# Patient Record
Sex: FEMALE | Race: WHITE | Employment: PART TIME | URBAN - METROPOLITAN AREA
[De-identification: names, ages, dates, MRNs, and addresses within clinical notes are randomized per-mention and may not be internally consistent; named-entity substitution may affect disease eponyms.]

---

## 2018-12-20 ENCOUNTER — OFFICE VISIT (OUTPATIENT)
Dept: INTERNAL MEDICINE CLINIC | Facility: CLINIC | Age: 58
End: 2018-12-20
Payer: COMMERCIAL

## 2018-12-20 VITALS
RESPIRATION RATE: 16 BRPM | SYSTOLIC BLOOD PRESSURE: 152 MMHG | DIASTOLIC BLOOD PRESSURE: 76 MMHG | HEART RATE: 72 BPM | TEMPERATURE: 99 F | WEIGHT: 186 LBS | BODY MASS INDEX: 32.96 KG/M2 | HEIGHT: 63 IN

## 2018-12-20 DIAGNOSIS — Z13.220 SCREENING FOR LIPID DISORDERS: Primary | ICD-10-CM

## 2018-12-20 DIAGNOSIS — E78.89 LIPIDS ABNORMAL: ICD-10-CM

## 2018-12-20 PROCEDURE — 99203 OFFICE O/P NEW LOW 30 MIN: CPT | Performed by: INTERNAL MEDICINE

## 2018-12-20 NOTE — PROGRESS NOTES
Mary Lazcano  7/28/1960    Patient presents with:  Establish Care: [facility] 7  Lab: Pt would like to discuss cholesterol labs. Pt brought non fasting labs from 9/29/17.       SUBJECTIVE   Mary Lzacano is a 60year old female who presents for eval of wine per week      Frequency: 4 or more times a week      Drinks per session: 1 or 2      Binge frequency: Never      Comment: cage 12/20/18    Drug use: No        OBJECTIVE:   /76 (BP Location: Right arm, Patient Position: Sitting, Cuff Size: rogelio

## 2018-12-21 ENCOUNTER — APPOINTMENT (OUTPATIENT)
Dept: LAB | Age: 58
End: 2018-12-21
Attending: INTERNAL MEDICINE
Payer: COMMERCIAL

## 2018-12-21 DIAGNOSIS — Z13.220 SCREENING FOR LIPID DISORDERS: ICD-10-CM

## 2018-12-21 PROCEDURE — 80061 LIPID PANEL: CPT | Performed by: INTERNAL MEDICINE

## 2018-12-26 ENCOUNTER — TELEPHONE (OUTPATIENT)
Dept: INTERNAL MEDICINE CLINIC | Facility: CLINIC | Age: 58
End: 2018-12-26

## 2018-12-26 NOTE — TELEPHONE ENCOUNTER
Pt returning our call for results
See result note.
Comment: Records reviewed. 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 Path originally read at [facility] with outside consultation from  at [facility]. [place] node was performed with reexcision of lesion and original slides were reviewed. 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 The melanocyte deposits in the positive node were deemed to be benign.
Detail Level: Simple

## 2019-01-29 ENCOUNTER — OFFICE VISIT (OUTPATIENT)
Dept: FAMILY MEDICINE CLINIC | Facility: CLINIC | Age: 59
End: 2019-01-29
Payer: COMMERCIAL

## 2019-01-29 VITALS
OXYGEN SATURATION: 97 % | WEIGHT: 186 LBS | HEART RATE: 76 BPM | TEMPERATURE: 99 F | RESPIRATION RATE: 16 BRPM | HEIGHT: 63 IN | SYSTOLIC BLOOD PRESSURE: 132 MMHG | DIASTOLIC BLOOD PRESSURE: 74 MMHG | BODY MASS INDEX: 32.96 KG/M2

## 2019-01-29 DIAGNOSIS — R30.0 DYSURIA: Primary | ICD-10-CM

## 2019-01-29 LAB
APPEARANCE: CLEAR
MULTISTIX LOT#: ABNORMAL NUMERIC
PH, URINE: 7.5 (ref 4.5–8)
SPECIFIC GRAVITY: 1.01 (ref 1–1.03)
URINE-COLOR: YELLOW
UROBILINOGEN,SEMI-QN: 0.2 MG/DL (ref 0–1.9)

## 2019-01-29 PROCEDURE — 87086 URINE CULTURE/COLONY COUNT: CPT | Performed by: NURSE PRACTITIONER

## 2019-01-29 PROCEDURE — 99213 OFFICE O/P EST LOW 20 MIN: CPT | Performed by: NURSE PRACTITIONER

## 2019-01-29 PROCEDURE — 81003 URINALYSIS AUTO W/O SCOPE: CPT | Performed by: NURSE PRACTITIONER

## 2019-01-29 RX ORDER — NITROFURANTOIN 25; 75 MG/1; MG/1
100 CAPSULE ORAL 2 TIMES DAILY
Qty: 14 CAPSULE | Refills: 0 | Status: SHIPPED | OUTPATIENT
Start: 2019-01-29 | End: 2019-02-05

## 2019-01-29 NOTE — PROGRESS NOTES
CHIEF COMPLAINT:   Patient presents with:  Urinary Frequency: with some irritation after using body oil during sexual intercourse - x3 days      HPI:   Yonas Rosales is a 60year old female who presents with symptoms of UTI.  Complaining of urinary EXAM:   /74 (BP Location: Right arm)   Pulse 76   Temp 98.6 °F (37 °C) (Oral)   Resp 16   Ht 63\"   Wt 186 lb   SpO2 97%   BMI 32.95 kg/m²   GENERAL: well developed, well nourished,in no apparent distress  CARDIO: RRR, no murmurs  LUNGS: clear to a Risk and benefits of medication discussed. Stressed importance of completing full course of antibiotic. Patient Instructions     Bladder Infection, Female (Adult)    Urine is normally doesn't have any bacteria in it.  But bacteria can get into the urina The most common cause of bladder infections is bacteria from the bowels. The bacteria get onto the skin around the opening of the urethra. From there, they can get into the urine and travel up to the bladder, causing inflammation and infection.  This usuall · Urinate more often. Don't try to hold urine in for a long time. · Wear loose-fitting clothes and cotton underwear. Avoid tight-fitting pants. · Improve your diet and prevent constipation.  Eat more fresh fruit and vegetables, and fiber, and less junk an The patient is asked to return in 3 days if not better. Call if fever, vomiting, worsening symptoms. Go to ED if back/flank pain with fever and vomiting.

## 2019-03-06 ENCOUNTER — HOSPITAL ENCOUNTER (EMERGENCY)
Facility: HOSPITAL | Age: 59
Discharge: HOME | End: 2019-03-06
Attending: EMERGENCY MEDICINE
Payer: COMMERCIAL

## 2019-03-06 VITALS
OXYGEN SATURATION: 98 % | DIASTOLIC BLOOD PRESSURE: 90 MMHG | RESPIRATION RATE: 17 BRPM | HEIGHT: 64 IN | SYSTOLIC BLOOD PRESSURE: 136 MMHG | TEMPERATURE: 97 F | WEIGHT: 182 LBS | HEART RATE: 87 BPM | BODY MASS INDEX: 31.07 KG/M2

## 2019-03-06 DIAGNOSIS — B34.9 VIRAL SYNDROME: ICD-10-CM

## 2019-03-06 DIAGNOSIS — R11.2 NAUSEA VOMITING AND DIARRHEA: Primary | ICD-10-CM

## 2019-03-06 DIAGNOSIS — R55 VASOVAGAL NEAR SYNCOPE: ICD-10-CM

## 2019-03-06 DIAGNOSIS — R19.7 NAUSEA VOMITING AND DIARRHEA: Primary | ICD-10-CM

## 2019-03-06 LAB
ALBUMIN SERPL-MCNC: 3.7 G/DL (ref 3.4–5)
ALBUMIN/GLOB SERPL: 0.8 {RATIO} (ref 1–2)
ALP LIVER SERPL-CCNC: 89 U/L (ref 46–118)
ALT SERPL-CCNC: 22 U/L (ref 13–56)
ANION GAP SERPL CALC-SCNC: 7 MMOL/L (ref 0–18)
AST SERPL-CCNC: 21 U/L (ref 15–37)
ATRIAL RATE: 90 BPM
BASOPHILS # BLD AUTO: 0.04 X10(3) UL (ref 0–0.2)
BASOPHILS NFR BLD AUTO: 0.4 %
BILIRUB SERPL-MCNC: 0.3 MG/DL (ref 0.1–2)
BILIRUB UR QL STRIP.AUTO: NEGATIVE
BUN BLD-MCNC: 15 MG/DL (ref 7–18)
BUN/CREAT SERPL: 15.6 (ref 10–20)
CALCIUM BLD-MCNC: 9.6 MG/DL (ref 8.5–10.1)
CHLORIDE SERPL-SCNC: 107 MMOL/L (ref 98–107)
CLARITY UR REFRACT.AUTO: CLEAR
CO2 SERPL-SCNC: 28 MMOL/L (ref 21–32)
COLOR UR AUTO: YELLOW
CREAT BLD-MCNC: 0.96 MG/DL (ref 0.55–1.02)
DEPRECATED RDW RBC AUTO: 45.1 FL (ref 35.1–46.3)
EOSINOPHIL # BLD AUTO: 0.15 X10(3) UL (ref 0–0.7)
EOSINOPHIL NFR BLD AUTO: 1.5 %
ERYTHROCYTE [DISTWIDTH] IN BLOOD BY AUTOMATED COUNT: 11.9 % (ref 11–15)
GLOBULIN PLAS-MCNC: 4.4 G/DL (ref 2.8–4.4)
GLUCOSE BLD-MCNC: 144 MG/DL (ref 70–99)
GLUCOSE UR STRIP.AUTO-MCNC: NEGATIVE MG/DL
HCT VFR BLD AUTO: 39.8 % (ref 35–48)
HGB BLD-MCNC: 13.5 G/DL (ref 12–16)
IMM GRANULOCYTES # BLD AUTO: 0.06 X10(3) UL (ref 0–1)
IMM GRANULOCYTES NFR BLD: 0.6 %
KETONES UR STRIP.AUTO-MCNC: NEGATIVE MG/DL
LIPASE SERPL-CCNC: 81 U/L (ref 73–393)
LYMPHOCYTES # BLD AUTO: 0.8 X10(3) UL (ref 1–4)
LYMPHOCYTES NFR BLD AUTO: 8.1 %
M PROTEIN MFR SERPL ELPH: 8.1 G/DL (ref 6.4–8.2)
MCH RBC QN AUTO: 34.8 PG (ref 26–34)
MCHC RBC AUTO-ENTMCNC: 33.9 G/DL (ref 31–37)
MCV RBC AUTO: 102.6 FL (ref 80–100)
MONOCYTES # BLD AUTO: 0.26 X10(3) UL (ref 0.1–1)
MONOCYTES NFR BLD AUTO: 2.6 %
NEUTROPHILS # BLD AUTO: 8.6 X10 (3) UL (ref 1.5–7.7)
NEUTROPHILS # BLD AUTO: 8.6 X10(3) UL (ref 1.5–7.7)
NEUTROPHILS NFR BLD AUTO: 86.8 %
NITRITE UR QL STRIP.AUTO: NEGATIVE
OSMOLALITY SERPL CALC.SUM OF ELEC: 297 MOSM/KG (ref 275–295)
P AXIS: 37 DEGREES
P-R INTERVAL: 166 MS
PH UR STRIP.AUTO: 6 [PH] (ref 4.5–8)
PLATELET # BLD AUTO: 225 10(3)UL (ref 150–450)
POTASSIUM SERPL-SCNC: 4.2 MMOL/L (ref 3.5–5.1)
PROT UR STRIP.AUTO-MCNC: NEGATIVE MG/DL
Q-T INTERVAL: 362 MS
QRS DURATION: 86 MS
QTC CALCULATION (BEZET): 442 MS
R AXIS: -19 DEGREES
RBC # BLD AUTO: 3.88 X10(6)UL (ref 3.8–5.3)
RBC UR QL AUTO: NEGATIVE
SODIUM SERPL-SCNC: 142 MMOL/L (ref 136–145)
SP GR UR STRIP.AUTO: 1.01 (ref 1–1.03)
T AXIS: 18 DEGREES
UROBILINOGEN UR STRIP.AUTO-MCNC: <2 MG/DL
VENTRICULAR RATE: 90 BPM
WBC # BLD AUTO: 9.9 X10(3) UL (ref 4–11)

## 2019-03-06 PROCEDURE — 87077 CULTURE AEROBIC IDENTIFY: CPT | Performed by: EMERGENCY MEDICINE

## 2019-03-06 PROCEDURE — 80053 COMPREHEN METABOLIC PANEL: CPT | Performed by: EMERGENCY MEDICINE

## 2019-03-06 PROCEDURE — 87086 URINE CULTURE/COLONY COUNT: CPT | Performed by: EMERGENCY MEDICINE

## 2019-03-06 PROCEDURE — 81001 URINALYSIS AUTO W/SCOPE: CPT | Performed by: EMERGENCY MEDICINE

## 2019-03-06 PROCEDURE — 85025 COMPLETE CBC W/AUTO DIFF WBC: CPT | Performed by: EMERGENCY MEDICINE

## 2019-03-06 PROCEDURE — 93010 ELECTROCARDIOGRAM REPORT: CPT

## 2019-03-06 PROCEDURE — 96374 THER/PROPH/DIAG INJ IV PUSH: CPT

## 2019-03-06 PROCEDURE — 87147 CULTURE TYPE IMMUNOLOGIC: CPT | Performed by: EMERGENCY MEDICINE

## 2019-03-06 PROCEDURE — 99284 EMERGENCY DEPT VISIT MOD MDM: CPT

## 2019-03-06 PROCEDURE — 93005 ELECTROCARDIOGRAM TRACING: CPT

## 2019-03-06 PROCEDURE — 87186 SC STD MICRODIL/AGAR DIL: CPT | Performed by: EMERGENCY MEDICINE

## 2019-03-06 PROCEDURE — 83690 ASSAY OF LIPASE: CPT | Performed by: EMERGENCY MEDICINE

## 2019-03-06 PROCEDURE — 96361 HYDRATE IV INFUSION ADD-ON: CPT

## 2019-03-06 RX ORDER — ONDANSETRON 2 MG/ML
4 INJECTION INTRAMUSCULAR; INTRAVENOUS ONCE
Status: COMPLETED | OUTPATIENT
Start: 2019-03-06 | End: 2019-03-06

## 2019-03-06 NOTE — ED INITIAL ASSESSMENT (HPI)
Patient was at home and was in the bathroom sitting over her toilet trying to vomit when she states she felt like she was going to pass out. Unable to say if she had LOC. This started tonight. Denies any hitting head. Denies any pain.

## 2019-03-06 NOTE — ED PROVIDER NOTES
Patient Seen in: [facility] Emergency Department    History   Patient presents with:  Fall (musculoskeletal, neurologic)    Stated Complaint: Fall; syncope; tachycardia; n/v/d    HPI    50-year-old female presents emergency room for evaluation of ne week      Drinks per session: 1 or 2      Binge frequency: Never      Comment: cage 12/20/18    Drug use: No      Review of Systems    Positive for stated complaint: Fall; syncope; tachycardia; n/v/d  Other systems are as noted in HPI.   Constitutional and Notable for the following components:       Result Value    Glucose 144 (*)     Calculated Osmolality 297 (*)     A/G Ratio 0.8 (*)     All other components within normal limits   URINALYSIS WITH CULTURE REFLEX - Abnormal; Notable for the following compone patient did have near syncopal  consistent with vasovagal episode. Patient was ambulated in the emergency room without any difficulty. I discussed supportive care and follow-up.   Patient is well-appearing and was discharged with  in good condition

## 2019-03-07 RX ORDER — CEPHALEXIN 500 MG/1
500 CAPSULE ORAL 4 TIMES DAILY
Qty: 40 CAPSULE | Refills: 0 | Status: SHIPPED | OUTPATIENT
Start: 2019-03-07 | End: 2019-03-17

## 2019-07-22 ENCOUNTER — APPOINTMENT (OUTPATIENT)
Dept: OTHER | Facility: HOSPITAL | Age: 59
End: 2019-07-22
Attending: PREVENTIVE MEDICINE

## 2019-07-25 ENCOUNTER — APPOINTMENT (OUTPATIENT)
Dept: OTHER | Facility: HOSPITAL | Age: 59
End: 2019-07-25
Attending: PREVENTIVE MEDICINE

## 2019-09-05 ENCOUNTER — APPOINTMENT (OUTPATIENT)
Dept: OTHER | Facility: HOSPITAL | Age: 59
End: 2019-09-05
Attending: PREVENTIVE MEDICINE

## 2019-11-01 ENCOUNTER — TELEPHONE (OUTPATIENT)
Dept: INTERNAL MEDICINE CLINIC | Facility: CLINIC | Age: 59
End: 2019-11-01

## 2019-11-01 NOTE — TELEPHONE ENCOUNTER
FWD to [facility], please assist pt in scheduling physical, pt has gaps in HM.

## 2019-12-20 ENCOUNTER — OFFICE VISIT (OUTPATIENT)
Dept: INTERNAL MEDICINE CLINIC | Facility: CLINIC | Age: 59
End: 2019-12-20
Payer: COMMERCIAL

## 2019-12-20 VITALS
TEMPERATURE: 99 F | HEART RATE: 104 BPM | BODY MASS INDEX: 32.72 KG/M2 | RESPIRATION RATE: 16 BRPM | DIASTOLIC BLOOD PRESSURE: 74 MMHG | SYSTOLIC BLOOD PRESSURE: 104 MMHG | WEIGHT: 187 LBS | HEIGHT: 63.19 IN

## 2019-12-20 DIAGNOSIS — Z13.1 SCREENING FOR DIABETES MELLITUS: ICD-10-CM

## 2019-12-20 DIAGNOSIS — Z13.29 THYROID DISORDER SCREENING: ICD-10-CM

## 2019-12-20 DIAGNOSIS — Z13.220 SCREENING FOR LIPID DISORDERS: ICD-10-CM

## 2019-12-20 DIAGNOSIS — F41.8 DEPRESSION WITH ANXIETY: ICD-10-CM

## 2019-12-20 DIAGNOSIS — Z00.00 ANNUAL PHYSICAL EXAM: Primary | ICD-10-CM

## 2019-12-20 DIAGNOSIS — Z13.228 SCREENING FOR METABOLIC DISORDER: ICD-10-CM

## 2019-12-20 DIAGNOSIS — E66.9 CLASS 1 OBESITY: ICD-10-CM

## 2019-12-20 DIAGNOSIS — Z12.39 SCREENING FOR BREAST CANCER: ICD-10-CM

## 2019-12-20 DIAGNOSIS — Z13.0 SCREENING FOR BLOOD DISEASE: ICD-10-CM

## 2019-12-20 DIAGNOSIS — R25.1 TREMOR OF BOTH HANDS: ICD-10-CM

## 2019-12-20 PROCEDURE — 99396 PREV VISIT EST AGE 40-64: CPT | Performed by: INTERNAL MEDICINE

## 2019-12-20 NOTE — PROGRESS NOTES
Laure Perry  7/28/1960    Patient presents with:  Wellness Visit: Mammogram due  Vaccinations: defers flu      HPI:   Laure Perry is a 60year old female who presents for an annual physical examination.     The patient has been in her usual rashes  EYES:denies blurred vision or double vision  HEENT: congested  LUNGS: denies shortness of breath with exertion  CARDIOVASCULAR: denies chest pain on exertion  GI: no nausea or abdominal pain  NEURO: denies headaches    EXAM:   /74 (BP Locatio Imaging & Consults:  None    No follow-ups on file. There are no Patient Instructions on file for this visit. All questions were answered and the patient agrees with the plan.      Thank you,  Heather Conner MD

## 2020-02-03 ENCOUNTER — LAB ENCOUNTER (OUTPATIENT)
Dept: LAB | Age: 60
End: 2020-02-03
Attending: INTERNAL MEDICINE
Payer: COMMERCIAL

## 2020-02-03 DIAGNOSIS — Z00.00 ANNUAL PHYSICAL EXAM: ICD-10-CM

## 2020-02-03 DIAGNOSIS — Z13.1 SCREENING FOR DIABETES MELLITUS: ICD-10-CM

## 2020-02-03 DIAGNOSIS — Z13.220 SCREENING FOR LIPID DISORDERS: ICD-10-CM

## 2020-02-03 DIAGNOSIS — Z13.0 SCREENING FOR BLOOD DISEASE: ICD-10-CM

## 2020-02-03 DIAGNOSIS — Z13.228 SCREENING FOR METABOLIC DISORDER: ICD-10-CM

## 2020-02-03 DIAGNOSIS — R71.8 ELEVATED MCV: ICD-10-CM

## 2020-02-03 DIAGNOSIS — R77.9 ELEVATED SERUM PROTEIN LEVEL: Primary | ICD-10-CM

## 2020-02-03 DIAGNOSIS — E66.9 CLASS 1 OBESITY: ICD-10-CM

## 2020-02-03 DIAGNOSIS — Z13.29 THYROID DISORDER SCREENING: ICD-10-CM

## 2020-02-03 DIAGNOSIS — R25.1 TREMOR OF BOTH HANDS: ICD-10-CM

## 2020-02-03 LAB
ALBUMIN SERPL-MCNC: 3.9 G/DL (ref 3.4–5)
ALBUMIN/GLOB SERPL: 0.8 {RATIO} (ref 1–2)
ALP LIVER SERPL-CCNC: 91 U/L (ref 46–118)
ALT SERPL-CCNC: 23 U/L (ref 13–56)
ANION GAP SERPL CALC-SCNC: 3 MMOL/L (ref 0–18)
AST SERPL-CCNC: 17 U/L (ref 15–37)
BASOPHILS # BLD AUTO: 0.04 X10(3) UL (ref 0–0.2)
BASOPHILS NFR BLD AUTO: 0.6 %
BILIRUB SERPL-MCNC: 0.3 MG/DL (ref 0.1–2)
BUN BLD-MCNC: 16 MG/DL (ref 7–18)
BUN/CREAT SERPL: 17.6 (ref 10–20)
CALCIUM BLD-MCNC: 9.7 MG/DL (ref 8.5–10.1)
CHLORIDE SERPL-SCNC: 103 MMOL/L (ref 98–112)
CHOLEST SMN-MCNC: 273 MG/DL (ref ?–200)
CO2 SERPL-SCNC: 30 MMOL/L (ref 21–32)
CREAT BLD-MCNC: 0.91 MG/DL (ref 0.55–1.02)
DEPRECATED RDW RBC AUTO: 45.3 FL (ref 35.1–46.3)
EOSINOPHIL # BLD AUTO: 0.35 X10(3) UL (ref 0–0.7)
EOSINOPHIL NFR BLD AUTO: 5.3 %
ERYTHROCYTE [DISTWIDTH] IN BLOOD BY AUTOMATED COUNT: 11.7 % (ref 11–15)
EST. AVERAGE GLUCOSE BLD GHB EST-MCNC: 114 MG/DL (ref 68–126)
GLOBULIN PLAS-MCNC: 5.2 G/DL (ref 2.8–4.4)
GLUCOSE BLD-MCNC: 101 MG/DL (ref 70–99)
HBA1C MFR BLD HPLC: 5.6 % (ref ?–5.7)
HCT VFR BLD AUTO: 41.8 % (ref 35–48)
HDLC SERPL-MCNC: 45 MG/DL (ref 40–59)
HGB BLD-MCNC: 13.9 G/DL (ref 12–16)
IMM GRANULOCYTES # BLD AUTO: 0.01 X10(3) UL (ref 0–1)
IMM GRANULOCYTES NFR BLD: 0.2 %
LDLC SERPL CALC-MCNC: 201 MG/DL (ref ?–100)
LYMPHOCYTES # BLD AUTO: 2.81 X10(3) UL (ref 1–4)
LYMPHOCYTES NFR BLD AUTO: 42.2 %
M PROTEIN MFR SERPL ELPH: 9.1 G/DL (ref 6.4–8.2)
MCH RBC QN AUTO: 35 PG (ref 26–34)
MCHC RBC AUTO-ENTMCNC: 33.3 G/DL (ref 31–37)
MCV RBC AUTO: 105.3 FL (ref 80–100)
MONOCYTES # BLD AUTO: 0.59 X10(3) UL (ref 0.1–1)
MONOCYTES NFR BLD AUTO: 8.9 %
NEUTROPHILS # BLD AUTO: 2.86 X10 (3) UL (ref 1.5–7.7)
NEUTROPHILS # BLD AUTO: 2.86 X10(3) UL (ref 1.5–7.7)
NEUTROPHILS NFR BLD AUTO: 42.8 %
NONHDLC SERPL-MCNC: 228 MG/DL (ref ?–130)
OSMOLALITY SERPL CALC.SUM OF ELEC: 283 MOSM/KG (ref 275–295)
PATIENT FASTING Y/N/NP: YES
PATIENT FASTING Y/N/NP: YES
PLATELET # BLD AUTO: 321 10(3)UL (ref 150–450)
POTASSIUM SERPL-SCNC: 4 MMOL/L (ref 3.5–5.1)
RBC # BLD AUTO: 3.97 X10(6)UL (ref 3.8–5.3)
SODIUM SERPL-SCNC: 136 MMOL/L (ref 136–145)
T4 FREE SERPL-MCNC: 0.7 NG/DL (ref 0.8–1.7)
TRIGL SERPL-MCNC: 133 MG/DL (ref 30–149)
TSI SER-ACNC: 4.8 MIU/ML (ref 0.36–3.74)
VLDLC SERPL CALC-MCNC: 27 MG/DL (ref 0–30)
WBC # BLD AUTO: 6.7 X10(3) UL (ref 4–11)

## 2020-02-03 PROCEDURE — 84439 ASSAY OF FREE THYROXINE: CPT | Performed by: INTERNAL MEDICINE

## 2020-02-03 PROCEDURE — 82607 VITAMIN B-12: CPT | Performed by: INTERNAL MEDICINE

## 2020-02-03 PROCEDURE — 83036 HEMOGLOBIN GLYCOSYLATED A1C: CPT | Performed by: INTERNAL MEDICINE

## 2020-02-03 PROCEDURE — 80061 LIPID PANEL: CPT | Performed by: INTERNAL MEDICINE

## 2020-02-03 PROCEDURE — 80050 GENERAL HEALTH PANEL: CPT | Performed by: INTERNAL MEDICINE

## 2020-02-04 ENCOUNTER — OFFICE VISIT (OUTPATIENT)
Dept: INTERNAL MEDICINE CLINIC | Facility: CLINIC | Age: 60
End: 2020-02-04
Payer: COMMERCIAL

## 2020-02-04 VITALS
SYSTOLIC BLOOD PRESSURE: 114 MMHG | HEART RATE: 104 BPM | BODY MASS INDEX: 33.07 KG/M2 | RESPIRATION RATE: 16 BRPM | TEMPERATURE: 99 F | HEIGHT: 63.29 IN | WEIGHT: 189 LBS | DIASTOLIC BLOOD PRESSURE: 74 MMHG

## 2020-02-04 DIAGNOSIS — R77.9 ELEVATED SERUM PROTEIN LEVEL: ICD-10-CM

## 2020-02-04 DIAGNOSIS — E78.5 DYSLIPIDEMIA: ICD-10-CM

## 2020-02-04 DIAGNOSIS — E03.9 ACQUIRED HYPOTHYROIDISM: ICD-10-CM

## 2020-02-04 DIAGNOSIS — R71.8 ELEVATED MCV: Primary | ICD-10-CM

## 2020-02-04 DIAGNOSIS — E66.9 CLASS 1 OBESITY: ICD-10-CM

## 2020-02-04 LAB — VIT B12 SERPL-MCNC: 1350 PG/ML (ref 193–986)

## 2020-02-04 PROCEDURE — 99214 OFFICE O/P EST MOD 30 MIN: CPT | Performed by: INTERNAL MEDICINE

## 2020-02-04 RX ORDER — ATORVASTATIN CALCIUM 10 MG/1
10 TABLET, FILM COATED ORAL DAILY
Qty: 90 TABLET | Refills: 3 | Status: SHIPPED | OUTPATIENT
Start: 2020-02-04 | End: 2020-06-25

## 2020-02-04 RX ORDER — LEVOTHYROXINE SODIUM 0.03 MG/1
25 TABLET ORAL DAILY
Qty: 90 TABLET | Refills: 0 | Status: SHIPPED | OUTPATIENT
Start: 2020-02-04 | End: 2020-05-11

## 2020-02-04 RX ORDER — BIOTIN 1 MG
1 TABLET ORAL DAILY
COMMUNITY
End: 2020-10-20 | Stop reason: ALTCHOICE

## 2020-02-04 RX ORDER — MELATONIN
1000 DAILY
COMMUNITY
End: 2020-10-20

## 2020-02-04 NOTE — PROGRESS NOTES
Mary Lazcano  7/28/1960    Patient presents with:  Lab Results: review labs from 2/3/20      SUBJECTIVE   Mary Lazcano is a 60year old female who presents as a follow-up.     The patient has been in her usual state of health and denies any ac more times a week      Drinks per session: 1 or 2      Binge frequency: Never      Comment: CAGE 12/20/19    Drug use: No        OBJECTIVE:   /74 (BP Location: Left arm, Patient Position: Sitting, Cuff Size: adult)   Pulse 104   Temp 98.8 °F (37.1 °C indicates understanding of these issues and agrees to the plan. TODAY'S ORDERS     No orders of the defined types were placed in this encounter.       Meds & Refills:  Requested Prescriptions      No prescriptions requested or ordered in this encounter

## 2020-02-05 ENCOUNTER — OFFICE VISIT (OUTPATIENT)
Dept: NEUROLOGY | Facility: CLINIC | Age: 60
End: 2020-02-05
Payer: COMMERCIAL

## 2020-02-05 VITALS
SYSTOLIC BLOOD PRESSURE: 128 MMHG | HEART RATE: 70 BPM | DIASTOLIC BLOOD PRESSURE: 74 MMHG | RESPIRATION RATE: 16 BRPM | BODY MASS INDEX: 33 KG/M2 | WEIGHT: 189 LBS

## 2020-02-05 DIAGNOSIS — R25.1 TREMOR OF RIGHT HAND: ICD-10-CM

## 2020-02-05 DIAGNOSIS — G25.0 BENIGN ESSENTIAL TREMOR: Primary | ICD-10-CM

## 2020-02-05 PROCEDURE — 99243 OFF/OP CNSLTJ NEW/EST LOW 30: CPT | Performed by: OTHER

## 2020-02-05 NOTE — PROGRESS NOTES
HPI:    Patient ID: Leidy Parkinson is a 60year old female. PCP: Dr Gail Post    HPI    Ms Karmen Foster is a 60year old female with history of anxiety and depression who presents for evaluation of right hand tremors.  Patient states this started a couple yea Musculoskeletal: Negative. Negative for gait problem. Skin: Negative. Allergic/Immunologic: Negative. Neurological: Positive for tremors. Negative for dizziness, facial asymmetry, speech difficulty, numbness and headaches.    Hematological: Negat X: Symmetric palate elevation. Uvula in midline. XI: Normal sternocleidomastoid and trapezius strength. XII: Tongue is in midline with normal lateral movements.   Sensory : Intact to all modalities including light touch, pinprick, vibration and proprioc

## 2020-02-05 NOTE — PROGRESS NOTES
The patient is having tremors in her hands. The patient states this started a couple years ago. Denies weakness in her hands.

## 2020-02-25 ENCOUNTER — LAB ENCOUNTER (OUTPATIENT)
Dept: LAB | Age: 60
End: 2020-02-25
Attending: INTERNAL MEDICINE
Payer: COMMERCIAL

## 2020-02-25 DIAGNOSIS — R77.9 ELEVATED SERUM PROTEIN LEVEL: ICD-10-CM

## 2020-02-25 DIAGNOSIS — R71.8 ELEVATED MCV: ICD-10-CM

## 2020-02-25 LAB — FOLATE SERPL-MCNC: 38.1 NG/ML (ref 8.7–?)

## 2020-02-25 PROCEDURE — 86334 IMMUNOFIX E-PHORESIS SERUM: CPT | Performed by: INTERNAL MEDICINE

## 2020-02-25 PROCEDURE — 36415 COLL VENOUS BLD VENIPUNCTURE: CPT | Performed by: INTERNAL MEDICINE

## 2020-02-25 PROCEDURE — 84156 ASSAY OF PROTEIN URINE: CPT | Performed by: INTERNAL MEDICINE

## 2020-02-25 PROCEDURE — 84165 PROTEIN E-PHORESIS SERUM: CPT | Performed by: INTERNAL MEDICINE

## 2020-02-25 PROCEDURE — 82746 ASSAY OF FOLIC ACID SERUM: CPT | Performed by: INTERNAL MEDICINE

## 2020-02-25 PROCEDURE — 86335 IMMUNFIX E-PHORSIS/URINE/CSF: CPT | Performed by: INTERNAL MEDICINE

## 2020-02-25 PROCEDURE — 83883 ASSAY NEPHELOMETRY NOT SPEC: CPT | Performed by: INTERNAL MEDICINE

## 2020-02-26 LAB
KAPPA FREE LIGHT CHAIN: 2.22 MG/DL (ref 0.33–1.94)
KAPPA/LAMBDA FLC RATIO: 1.19 (ref 0.26–1.65)
LAMBDA FREE LIGHT CHAIN: 1.86 MG/DL (ref 0.57–2.63)

## 2020-02-27 LAB
FREE UR LAMBDA LIGHT CHAIN: 1.19 MG/L
FREE URINARY KAPPA LIGHT CHAIN: 13.59 MG/L

## 2020-03-01 LAB
ALBUMIN SERPL ELPH-MCNC: 4.48 G/DL (ref 3.75–5.21)
ALBUMIN/GLOB SERPL: 1.11 {RATIO} (ref 1–2)
ALPHA1 GLOB SERPL ELPH-MCNC: 0.3 G/DL (ref 0.19–0.46)
ALPHA2 GLOB SERPL ELPH-MCNC: 0.71 G/DL (ref 0.48–1.05)
B-GLOBULIN SERPL ELPH-MCNC: 1.14 G/DL (ref 0.68–1.23)
GAMMA GLOB SERPL ELPH-MCNC: 1.88 G/DL (ref 0.62–1.7)
M PROTEIN MFR SERPL ELPH: 8.5 G/DL (ref 6.4–8.2)

## 2020-03-12 ENCOUNTER — APPOINTMENT (OUTPATIENT)
Dept: HEMATOLOGY/ONCOLOGY | Facility: HOSPITAL | Age: 60
End: 2020-03-12
Attending: SPECIALIST
Payer: COMMERCIAL

## 2020-03-12 VITALS
BODY MASS INDEX: 33.6 KG/M2 | WEIGHT: 192 LBS | OXYGEN SATURATION: 98 % | HEART RATE: 107 BPM | RESPIRATION RATE: 16 BRPM | DIASTOLIC BLOOD PRESSURE: 78 MMHG | SYSTOLIC BLOOD PRESSURE: 118 MMHG | HEIGHT: 63.31 IN | TEMPERATURE: 97 F

## 2020-03-12 DIAGNOSIS — D89.0 POLYCLONAL GAMMOPATHY: ICD-10-CM

## 2020-03-12 DIAGNOSIS — D75.89 MACROCYTOSIS WITHOUT ANEMIA: Primary | ICD-10-CM

## 2020-03-12 PROCEDURE — 99245 OFF/OP CONSLTJ NEW/EST HI 55: CPT | Performed by: SPECIALIST

## 2020-03-12 NOTE — PROGRESS NOTES
THE [facility] Consultation Note      Patient Name: Piyush Manuel   YOB: 1960  Medical Record Number: [id number]  Consulting Physician: Gama Vazquez M.D.    Referring Physician: Charlotte Ambriz MD    Date of Consultation: Take 1 tablet (10 mg total) by mouth daily. , Disp: 90 tablet, Rfl: 3  Levothyroxine Sodium 25 MCG Oral Tab, Take 1 tablet (25 mcg total) by mouth daily. , Disp: 90 tablet, Rfl: 0  DULoxetine HCl 20 MG Oral Cap DR Particles, Take 1 capsule by mouth nightly. , atraumatic. Eyes   Conjunctiva clear; sclera anicteric. ENMT                 External nose normal; external ears normal.  Neck                   Supple, without masses.   Hematologic/Lymphatic No cervical, supraclavicular, axillary lymphadenopathy; no pet mg/dL    LDL Cholesterol 201 (H) <100 mg/dL    VLDL 27 0 - 30 mg/dL    Non HDL Chol 228 (H) <130 mg/dL    FASTING Yes    HEMOGLOBIN A1C    Collection Time: 02/03/20 10:45 AM   Result Value Ref Range    HgbA1C 5.6 <5.7 %    Estimated Average Glucose 114 68 FOLIC ACID SERUM(FOLATE)    Collection Time: 02/25/20  3:47 PM   Result Value Ref Range    Folate (Folic Acid) 33.9 >=3.4 ng/mL   SERUM PROTEIN ELECTROPHORESIS    Collection Time: 02/25/20  3:47 PM   Result Value Ref Range    Total Protein 8.5 (H) 6.4 - levothyroxine. Patient will return for follow up with laboratory studies at the end of 04/2020. Planned Follow Up   Patient will return for follow up with laboratory studies at the end of 04/2020. Risk Level: High - macrocytosis.      Electronically

## 2020-04-20 ENCOUNTER — LABORATORY ENCOUNTER (OUTPATIENT)
Dept: LAB | Age: 60
End: 2020-04-20
Attending: SPECIALIST
Payer: COMMERCIAL

## 2020-04-20 DIAGNOSIS — D75.89 MACROCYTOSIS WITHOUT ANEMIA: ICD-10-CM

## 2020-04-20 PROCEDURE — 84443 ASSAY THYROID STIM HORMONE: CPT

## 2020-04-20 PROCEDURE — 36415 COLL VENOUS BLD VENIPUNCTURE: CPT

## 2020-04-20 PROCEDURE — 85025 COMPLETE CBC W/AUTO DIFF WBC: CPT

## 2020-04-20 PROCEDURE — 84439 ASSAY OF FREE THYROXINE: CPT

## 2020-04-20 PROCEDURE — 85045 AUTOMATED RETICULOCYTE COUNT: CPT

## 2020-04-20 NOTE — PROGRESS NOTES
Virtual Telephone Check-In    Suzanne Elise verbally consents to a Virtual/Telephone Check-In visit on 4/23/2020.     Patient understands and accepts financial responsibility for any deductible, co-insurance and/or co-pays associated with this service physician)  Paternal grandmother with breast cancer at advanced age. Social History (historical data, reviewed by physician)  Denies tobacco use; drinks 2 glasses of wine daily.      Current Medications  Vitamin B-12 1000 MCG Oral Tab, Take 1,000 mcg by 46.9 (H) 35.1 - 46.3 fL    Neutrophil Absolute Prelim 2.19 1.50 - 7.70 x10 (3) uL    Neutrophil Absolute 2.19 1.50 - 7.70 x10(3) uL    Lymphocyte Absolute 2.82 1.00 - 4.00 x10(3) uL    Monocyte Absolute 0.58 0.10 - 1.00 x10(3) uL    Eosinophil Absolute 0.2 Director of [place] Discontinue Regimen: Epiduo forte due to irritation Continue Regimen: Aczone - Apply a pea sized amount to full face in the morning after cleansing.\\nDoxycycline every other day until finished Samples Given: Retin-A Micro 0.06% - Apply a pea size amount to the full face once daily at bedtime  (Samples given, patient may call for Rx if she likes the sample) Otc Regimen: Cetaphil oil control Foaming cleanser and moisturizer Detail Level: Zone Initiate Treatment: Clindamycin - Apply a small amount to he chest and back daily in the morning

## 2020-04-23 ENCOUNTER — VIRTUAL PHONE E/M (OUTPATIENT)
Dept: HEMATOLOGY/ONCOLOGY | Facility: HOSPITAL | Age: 60
End: 2020-04-23
Attending: SPECIALIST
Payer: COMMERCIAL

## 2020-04-23 DIAGNOSIS — D75.89 MACROCYTOSIS WITHOUT ANEMIA: ICD-10-CM

## 2020-04-23 PROCEDURE — 99213 OFFICE O/P EST LOW 20 MIN: CPT | Performed by: SPECIALIST

## 2020-05-11 RX ORDER — LEVOTHYROXINE SODIUM 0.03 MG/1
TABLET ORAL
Qty: 90 TABLET | Refills: 0 | Status: SHIPPED | OUTPATIENT
Start: 2020-05-11 | End: 2020-08-07

## 2020-05-11 NOTE — TELEPHONE ENCOUNTER
Last OV: 2/4/20 lab f/u    Upcoming OV: no upcoming appt     Latest labs: 2/3/20 Vit B12, T4, TSH w/ ref, A1c, Lipid, CMP and CBC    Latest RX: LEVOTHYROXINE 0.025MG (25MCG) TAB 90 tabs 0 refills on 2/4/20    Per protocol, passed. Rx sent.

## 2020-06-24 ENCOUNTER — TELEPHONE (OUTPATIENT)
Dept: INTERNAL MEDICINE CLINIC | Facility: CLINIC | Age: 60
End: 2020-06-24

## 2020-06-24 NOTE — TELEPHONE ENCOUNTER
LOV with AD 2/4/2020      ASSESSMENT AND PLAN:   Mary Lazcano is a 60year old female who presents as a follow-up.     Hypothyroidism:  Fatigue and weight gain  Levothyroxine initiated  TSH and free T4 in four weeks  Does not supplement with melaton

## 2020-06-24 NOTE — TELEPHONE ENCOUNTER
Pt stated she started taking Atorvastatin in May and she believes she is experiencing side effects. She stated she has constipation, heart burn, muscle soreness. Calling to see if she can take it every other day?  Please advise

## 2020-06-25 RX ORDER — ROSUVASTATIN CALCIUM 5 MG/1
5 TABLET, COATED ORAL NIGHTLY
Qty: 30 TABLET | Refills: 0 | Status: SHIPPED | OUTPATIENT
Start: 2020-06-25 | End: 2020-08-06

## 2020-06-25 NOTE — TELEPHONE ENCOUNTER
Rosuvastatin therapy ordered. This works by a hydrophilic mechanism, which will likely improve symptoms.

## 2020-06-25 NOTE — TELEPHONE ENCOUNTER
Spoke with patient notified d/c Atorvastatin and start Rosuvastatin. Patient verbalized understanding and agreeable to POC.

## 2020-07-24 ENCOUNTER — TELEPHONE (OUTPATIENT)
Dept: INTERNAL MEDICINE CLINIC | Facility: CLINIC | Age: 60
End: 2020-07-24

## 2020-07-24 DIAGNOSIS — E78.5 DYSLIPIDEMIA: ICD-10-CM

## 2020-07-24 DIAGNOSIS — E03.9 ACQUIRED HYPOTHYROIDISM: Primary | ICD-10-CM

## 2020-07-24 NOTE — TELEPHONE ENCOUNTER
We can hold it x 7 days and observe symptoms. Statin, or other, therapy is warranted per patient's history of dyslipidemia.

## 2020-07-24 NOTE — TELEPHONE ENCOUNTER
Pt is taking Rosuvastatin Calcium 5 MG Oral Tab and is due for refill but she is getting joint pain from taking it and not sure she should refill this or different meds?  Call to advise

## 2020-07-24 NOTE — TELEPHONE ENCOUNTER
Patient notified to hold the Crestor for 7 days, observe for s/s and call with update after the 7 days. Pt verbalizes understanding.

## 2020-07-31 NOTE — TELEPHONE ENCOUNTER
Pt called stated she is better a little sore but calling to see if she needs to go back on the statin? She prefers to stay off of it if possible.  Please advise

## 2020-07-31 NOTE — TELEPHONE ENCOUNTER
I do understand her concern, however, based on her prior LDL statin therapy will be warranted. Please advise a trial of once every-other-day dosing.

## 2020-08-06 RX ORDER — ROSUVASTATIN CALCIUM 5 MG/1
TABLET, COATED ORAL
Qty: 90 TABLET | Refills: 0 | OUTPATIENT
Start: 2020-08-06

## 2020-08-06 RX ORDER — ROSUVASTATIN CALCIUM 5 MG/1
5 TABLET, COATED ORAL NIGHTLY
Qty: 30 TABLET | Refills: 0 | Status: SHIPPED | OUTPATIENT
Start: 2020-08-06 | End: 2020-10-20 | Stop reason: ALTCHOICE

## 2020-08-06 NOTE — TELEPHONE ENCOUNTER
Spoke with patient advised per AD can try taking Rosuvastatin every other day. Patient verbalized understanding and agreeable to POC. Patient indicates needs refill sent to pharmacy, refill sent.

## 2020-08-07 RX ORDER — ROSUVASTATIN CALCIUM 5 MG/1
TABLET, COATED ORAL
Qty: 30 TABLET | Refills: 0 | OUTPATIENT
Start: 2020-08-07

## 2020-08-07 RX ORDER — LEVOTHYROXINE SODIUM 0.03 MG/1
TABLET ORAL
Qty: 90 TABLET | Refills: 0 | Status: SHIPPED | OUTPATIENT
Start: 2020-08-07 | End: 2020-12-21

## 2020-08-07 NOTE — TELEPHONE ENCOUNTER
PASSED per protocol, refill sent. Last PE 12.20.19   Rosuvastatin deined-AD filled 8. 6.20   Future Appointments   Date Time Provider Ann Olivai   10/19/2020  1:30 PM Isha Jon MD [facility]

## 2020-08-14 NOTE — TELEPHONE ENCOUNTER
Pt called back and stated that she has been taking this medication every other day as directed but is still having the knee pain.  Pt would like to stop taking the medication all together       Please advise

## 2020-08-14 NOTE — TELEPHONE ENCOUNTER
We can try stopping the medication for 7 consecutive days and monitor symptoms. If strictly knee joint pain it is unlikely to be secondary to statin. If no change, I advise either in-office evaluation or evaluation by the orthopedic surgery service.

## 2020-08-21 NOTE — TELEPHONE ENCOUNTER
I understand her concerns and am glad for her improvement. Given the degree of elevation of LDL level (200) and intolerance to statin therapy, I would recommend further evaluation by the cardiology service.  Please provide contact information to [facility]

## 2020-08-21 NOTE — TELEPHONE ENCOUNTER
Patient states she has been off the Rosuvastatin 5mg daily for 7 days, her right knee was severe sharp pain and the left knee some pain but now the pain has totally resolved, feels so much better, would prefer not to take the Rosuvastatin at all.   Please a

## 2020-08-28 NOTE — TELEPHONE ENCOUNTER
Patient asking if possible to recheck lipid panel prior to seeing a cardiologist. Last lipid 2/3/2020. Patient given cardiology referral as advised by AD.

## 2020-09-21 ENCOUNTER — LAB ENCOUNTER (OUTPATIENT)
Dept: LAB | Age: 60
End: 2020-09-21
Attending: INTERNAL MEDICINE
Payer: COMMERCIAL

## 2020-09-21 DIAGNOSIS — E03.9 ACQUIRED HYPOTHYROIDISM: ICD-10-CM

## 2020-09-21 DIAGNOSIS — E78.5 DYSLIPIDEMIA: ICD-10-CM

## 2020-09-21 LAB
CHOLEST SMN-MCNC: 257 MG/DL (ref ?–200)
HDLC SERPL-MCNC: 48 MG/DL (ref 40–59)
LDLC SERPL CALC-MCNC: 176 MG/DL (ref ?–100)
NONHDLC SERPL-MCNC: 209 MG/DL (ref ?–130)
PATIENT FASTING Y/N/NP: YES
T4 FREE SERPL-MCNC: 0.8 NG/DL (ref 0.8–1.7)
TRIGL SERPL-MCNC: 165 MG/DL (ref 30–149)
TSI SER-ACNC: 8.05 MIU/ML (ref 0.36–3.74)
VLDLC SERPL CALC-MCNC: 33 MG/DL (ref 0–30)

## 2020-09-21 PROCEDURE — 84439 ASSAY OF FREE THYROXINE: CPT | Performed by: INTERNAL MEDICINE

## 2020-09-21 PROCEDURE — 80061 LIPID PANEL: CPT | Performed by: INTERNAL MEDICINE

## 2020-09-21 PROCEDURE — 84443 ASSAY THYROID STIM HORMONE: CPT | Performed by: INTERNAL MEDICINE

## 2020-09-24 ENCOUNTER — TELEPHONE (OUTPATIENT)
Dept: INTERNAL MEDICINE CLINIC | Facility: CLINIC | Age: 60
End: 2020-09-24

## 2020-09-24 NOTE — TELEPHONE ENCOUNTER
Pt returning call on results. Pt stated ok to leave message with results. [phone]. Please advise.

## 2020-10-02 ENCOUNTER — TELEPHONE (OUTPATIENT)
Dept: INTERNAL MEDICINE CLINIC | Facility: CLINIC | Age: 60
End: 2020-10-02

## 2020-10-02 NOTE — TELEPHONE ENCOUNTER
LM for pt at [phone] (M)vm giving information AD would like her to see Dr Karli Winters or Dr Tamera Duane Cardiology for evaluation of cholesterol and medications. Asked pt to call back if any questions.

## 2020-10-02 NOTE — TELEPHONE ENCOUNTER
Patient asking to see Cardiologist because she does not feel she is tolerating the Rosuvastatin very well and has bilateral knee pain. LOV 2/4/20 with AD.  Labs 9/21/20 notes below:    Written by Surekha Naik RN on 9/28/2020 12:42 PM  Your LDL (bad chol

## 2020-10-02 NOTE — TELEPHONE ENCOUNTER
Pt called and stated that her knee pain is worse and she is not tolerating the Rosuvastatin Calcium 5 MG Oral Tab and wants to make an appt with a cardiologist per AD request. Pt is asking for the information on who AD would like her to see     Please advi

## 2020-10-19 ENCOUNTER — OFFICE VISIT (OUTPATIENT)
Dept: HEMATOLOGY/ONCOLOGY | Facility: HOSPITAL | Age: 60
End: 2020-10-19
Attending: SPECIALIST
Payer: COMMERCIAL

## 2020-10-19 DIAGNOSIS — D75.89 MACROCYTOSIS WITHOUT ANEMIA: Primary | ICD-10-CM

## 2020-10-20 ENCOUNTER — OFFICE VISIT (OUTPATIENT)
Dept: HEMATOLOGY/ONCOLOGY | Facility: HOSPITAL | Age: 60
End: 2020-10-20
Attending: SPECIALIST
Payer: COMMERCIAL

## 2020-10-20 VITALS
RESPIRATION RATE: 16 BRPM | DIASTOLIC BLOOD PRESSURE: 88 MMHG | TEMPERATURE: 97 F | OXYGEN SATURATION: 99 % | WEIGHT: 187.5 LBS | SYSTOLIC BLOOD PRESSURE: 141 MMHG | BODY MASS INDEX: 32.81 KG/M2 | HEIGHT: 63.31 IN | HEART RATE: 104 BPM

## 2020-10-20 DIAGNOSIS — D75.89 MACROCYTOSIS WITHOUT ANEMIA: ICD-10-CM

## 2020-10-20 DIAGNOSIS — R79.89 ABNORMAL LFTS: Primary | ICD-10-CM

## 2020-10-20 PROCEDURE — 99213 OFFICE O/P EST LOW 20 MIN: CPT | Performed by: SPECIALIST

## 2020-10-20 NOTE — PROGRESS NOTES
Patient is here today for follow up with Kristine Parikh for Macrocytosis. Patient denies pain. Stated she is feeling good. Medication list and medical history were reviewed and updated.      Education Record    Learner:  Patient    Disease / Diagnosis: follow u

## 2020-10-25 NOTE — PROGRESS NOTES
THE [facility]ematology Oncology Group Progress Note      Patient Name: Ariana Bachelor   YOB: 1960  Medical Record Number: [id number]  Attending Physician: Roselyn Mckeon M.D. Date of Visit: 10/20/2020      Chief Complaint  Macrocytosis. Allergies. Review of Systems   Constitutional      No fevers, chills, night sweats, excessive fatigue. Respiratory          No dyspnea, cough, hemoptysis, pleuritic chest pain.   Neurologic           No headache, blurred vision, areas of focal weakne uL    Neutrophil Absolute 1.85 1.50 - 7.70 x10(3) uL    Lymphocyte Absolute 2.90 1.00 - 4.00 x10(3) uL    Monocyte Absolute 0.59 0.10 - 1.00 x10(3) uL    Eosinophil Absolute 0.26 0.00 - 0.70 x10(3) uL    Basophil Absolute 0.04 0.00 - 0.20 x10(3) uL    Imelda

## 2020-11-09 ENCOUNTER — ORDER TRANSCRIPTION (OUTPATIENT)
Dept: ADMINISTRATIVE | Facility: HOSPITAL | Age: 60
End: 2020-11-09

## 2020-11-09 DIAGNOSIS — Z01.818 PREOP EXAMINATION: Primary | ICD-10-CM

## 2020-11-09 DIAGNOSIS — Z11.59 ENCOUNTER FOR SCREENING FOR OTHER VIRAL DISEASES: ICD-10-CM

## 2020-12-21 RX ORDER — LEVOTHYROXINE SODIUM 0.03 MG/1
TABLET ORAL
Qty: 90 TABLET | Refills: 0 | Status: SHIPPED | OUTPATIENT
Start: 2020-12-21 | End: 2021-03-29

## 2020-12-21 NOTE — TELEPHONE ENCOUNTER
Last OV: 2/4/20 acute f/u  Last PE: 12/20/19    Future Appointments   Date Time Provider Ann Bray   4/22/2021 10:15 AM Pantera Balbuena MD [address]        Latest labs: 9/21/20- T4, TSH and Lipid    Latest RX: Levothyroxine- 90

## 2021-02-05 ENCOUNTER — ORDER TRANSCRIPTION (OUTPATIENT)
Dept: ADMINISTRATIVE | Facility: HOSPITAL | Age: 61
End: 2021-02-05

## 2021-02-05 DIAGNOSIS — R06.02 SOB (SHORTNESS OF BREATH): Primary | ICD-10-CM

## 2021-02-05 DIAGNOSIS — E04.9 GOITER: ICD-10-CM

## 2021-02-05 DIAGNOSIS — E78.00 PURE HYPERCHOLESTEROLEMIA: ICD-10-CM

## 2021-02-05 DIAGNOSIS — R53.83 OTHER FATIGUE: ICD-10-CM

## 2021-02-25 ENCOUNTER — TELEPHONE (OUTPATIENT)
Dept: INTERNAL MEDICINE CLINIC | Facility: CLINIC | Age: 61
End: 2021-02-25

## 2021-03-29 RX ORDER — LEVOTHYROXINE SODIUM 0.03 MG/1
TABLET ORAL
Qty: 90 TABLET | Refills: 0 | Status: SHIPPED | OUTPATIENT
Start: 2021-03-29 | End: 2021-06-24

## 2021-03-29 NOTE — TELEPHONE ENCOUNTER
Last VISIT 02/04/20    Last REFILL 12/21/20 qty 90 w/0 refills    Last LABS 10/20/20 CBC, CMP done    No Future Appointments      Per PROTOCOL? Failed    Please Approve or Deny.

## 2021-04-22 ENCOUNTER — APPOINTMENT (OUTPATIENT)
Dept: HEMATOLOGY/ONCOLOGY | Facility: HOSPITAL | Age: 61
End: 2021-04-22
Attending: SPECIALIST
Payer: COMMERCIAL

## 2021-04-28 ENCOUNTER — OFFICE VISIT (OUTPATIENT)
Dept: HEMATOLOGY/ONCOLOGY | Age: 61
End: 2021-04-28
Attending: SPECIALIST
Payer: COMMERCIAL

## 2021-04-28 VITALS
HEIGHT: 63.31 IN | DIASTOLIC BLOOD PRESSURE: 88 MMHG | OXYGEN SATURATION: 96 % | BODY MASS INDEX: 32.97 KG/M2 | WEIGHT: 188.38 LBS | TEMPERATURE: 96 F | RESPIRATION RATE: 16 BRPM | HEART RATE: 95 BPM | SYSTOLIC BLOOD PRESSURE: 125 MMHG

## 2021-04-28 DIAGNOSIS — D75.89 MACROCYTOSIS WITHOUT ANEMIA: ICD-10-CM

## 2021-04-28 PROCEDURE — 99213 OFFICE O/P EST LOW 20 MIN: CPT | Performed by: SPECIALIST

## 2021-04-28 NOTE — PROGRESS NOTES
Patient is here today for follow up with Guicho Benjamin for Hematology Follow Up. Patient denies pain. Stated she is feeling good. Medication list and medical history were reviewed and updated.      Education Record    Learner:  Patient    Disease / Diagnosis:

## 2021-04-28 NOTE — PROGRESS NOTES
THE [facility]ology Oncology [facility] Progress Note      Patient Name: Anna Neumann   YOB: 1960  Medical Record Number: [id number]  Attending Physician: Branda Kanner. Bryan Allen M.D. Date of Visit: 4/30/2021      Chief Complaint  Macrocytosis. 125/88 (BP Location: Left arm, Patient Position: Sitting, Cuff Size: adult)   Pulse 95   Temp (!) 96 °F (35.6 °C) (Tympanic)   Resp 16   Ht 1.608 m (5' 3.31\")   Wt 85.5 kg (188 lb 6.4 oz)   LMP  (LMP Unknown)   SpO2 96%   BMI 33.05 kg/m²     Physical Exam Immature Granulocyte Absolute 0.01 0.00 - 1.00 x10(3) uL    Neutrophil % 39.8 %    Lymphocyte % 44.0 %    Monocyte % 11.6 %    Eosinophil % 3.6 %    Basophil % 0.8 %    Immature Granulocyte % 0.2 %      Impression and Plan   1.    Macrocytosis: Complete blo

## 2021-04-30 ENCOUNTER — OFFICE VISIT (OUTPATIENT)
Dept: HEMATOLOGY/ONCOLOGY | Facility: HOSPITAL | Age: 61
End: 2021-04-30
Attending: SPECIALIST
Payer: COMMERCIAL

## 2021-04-30 DIAGNOSIS — D75.89 MACROCYTOSIS WITHOUT ANEMIA: Primary | ICD-10-CM

## 2021-05-27 ENCOUNTER — TELEPHONE (OUTPATIENT)
Dept: INTERNAL MEDICINE CLINIC | Facility: CLINIC | Age: 61
End: 2021-05-27

## 2021-05-27 DIAGNOSIS — Z12.31 ENCOUNTER FOR SCREENING MAMMOGRAM FOR MALIGNANT NEOPLASM OF BREAST: Primary | ICD-10-CM

## 2021-06-24 RX ORDER — LEVOTHYROXINE SODIUM 0.03 MG/1
TABLET ORAL
Qty: 90 TABLET | Refills: 0 | Status: SHIPPED | OUTPATIENT
Start: 2021-06-24 | End: 2021-10-23

## 2021-06-24 NOTE — TELEPHONE ENCOUNTER
Last VISIT 02/04/20    Last REFILL 03/29/21 qty 90 w/0 refills    Last LABS 04/28;21 CBC done    No future appointments. Per PROTOCOL? Failed    Please Approve or Deny.

## 2021-07-09 ENCOUNTER — OFFICE VISIT (OUTPATIENT)
Dept: FAMILY MEDICINE CLINIC | Facility: CLINIC | Age: 61
End: 2021-07-09
Payer: COMMERCIAL

## 2021-07-09 VITALS
OXYGEN SATURATION: 98 % | SYSTOLIC BLOOD PRESSURE: 122 MMHG | DIASTOLIC BLOOD PRESSURE: 80 MMHG | HEART RATE: 97 BPM | TEMPERATURE: 98 F

## 2021-07-09 DIAGNOSIS — N30.00 ACUTE CYSTITIS WITHOUT HEMATURIA: Primary | ICD-10-CM

## 2021-07-09 DIAGNOSIS — R30.0 DYSURIA: ICD-10-CM

## 2021-07-09 LAB
APPEARANCE: CLEAR
BILIRUBIN: NEGATIVE
GLUCOSE (URINE DIPSTICK): NEGATIVE MG/DL
KETONES (URINE DIPSTICK): NEGATIVE MG/DL
MULTISTIX LOT#: 4011 NUMERIC
NITRITE, URINE: NEGATIVE
PH, URINE: 5.5 (ref 4.5–8)
PROTEIN (URINE DIPSTICK): NEGATIVE MG/DL
SPECIFIC GRAVITY: 1.02 (ref 1–1.03)
URINE-COLOR: YELLOW
UROBILINOGEN,SEMI-QN: 0.2 MG/DL (ref 0–1.9)

## 2021-07-09 PROCEDURE — 87186 SC STD MICRODIL/AGAR DIL: CPT | Performed by: FAMILY MEDICINE

## 2021-07-09 PROCEDURE — 87086 URINE CULTURE/COLONY COUNT: CPT | Performed by: FAMILY MEDICINE

## 2021-07-09 PROCEDURE — 3074F SYST BP LT 130 MM HG: CPT | Performed by: FAMILY MEDICINE

## 2021-07-09 PROCEDURE — 81003 URINALYSIS AUTO W/O SCOPE: CPT | Performed by: FAMILY MEDICINE

## 2021-07-09 PROCEDURE — 87077 CULTURE AEROBIC IDENTIFY: CPT | Performed by: FAMILY MEDICINE

## 2021-07-09 PROCEDURE — 3079F DIAST BP 80-89 MM HG: CPT | Performed by: FAMILY MEDICINE

## 2021-07-09 PROCEDURE — 99213 OFFICE O/P EST LOW 20 MIN: CPT | Performed by: FAMILY MEDICINE

## 2021-07-09 RX ORDER — NITROFURANTOIN 25; 75 MG/1; MG/1
100 CAPSULE ORAL 2 TIMES DAILY
Qty: 14 CAPSULE | Refills: 0 | Status: SHIPPED | OUTPATIENT
Start: 2021-07-09 | End: 2021-07-16

## 2021-07-09 NOTE — PROGRESS NOTES
Devin Brown is a 63year old female. HPI:   Patient presents with symptoms of UTI for a few weeks off and on. Complaining of urinary frequency, urgency, dysuria, mild suprapubic pain  Denies back pain, fever, hematuria.   Pt has occasional histor normoactive BS x4, no masses, HSM; minimal suprapubic tenderness,no CVAT    RESULTS:      Recent Results (from the past 24 hour(s))   URINALYSIS, AUTO, W/O SCOPE    Collection Time: 07/09/21  4:10 PM   Result Value Ref Range    Glucose Urine Negative Negat vomiting, worsening symptoms. The patient indicates understanding of these issues and agrees to the plan.

## 2021-07-09 NOTE — PATIENT INSTRUCTIONS
Take antibiotics with food and plenty of water. Eat yogurt daily or use probiotics. (Lisamary WilsonRanjan is a good example of an OTC probiotic)  Make sure to finish the entire antibiotic treatment.   We will send the urine specimen for culture and call you in 2-3 days w

## 2021-10-23 NOTE — TELEPHONE ENCOUNTER
Been Following AD  Last OV 2/4/20  Last CPE 12/20/19   Last Labs CBC 4/28/21    Last Rx fill Levothyroxine 25mcg #90 0R 6/24/21    No future appointments. Per PROTOCOL failed. Appt/TSH due, last TSH out of range    Please Approve or Deny.     FWD to [facility],

## 2021-10-24 RX ORDER — LEVOTHYROXINE SODIUM 0.03 MG/1
TABLET ORAL
Qty: 30 TABLET | Refills: 0 | Status: SHIPPED | OUTPATIENT
Start: 2021-10-24 | End: 2021-11-29

## 2021-10-25 RX ORDER — LEVOTHYROXINE SODIUM 0.03 MG/1
TABLET ORAL
Qty: 90 TABLET | Refills: 0 | OUTPATIENT
Start: 2021-10-25

## 2021-11-29 RX ORDER — LEVOTHYROXINE SODIUM 0.03 MG/1
TABLET ORAL
Qty: 90 TABLET | Refills: 0 | OUTPATIENT
Start: 2021-11-29

## 2021-11-29 RX ORDER — LEVOTHYROXINE SODIUM 0.03 MG/1
TABLET ORAL
Qty: 30 TABLET | Refills: 0 | Status: SHIPPED | OUTPATIENT
Start: 2021-11-29 | End: 2021-12-28

## 2021-12-28 RX ORDER — LEVOTHYROXINE SODIUM 0.03 MG/1
TABLET ORAL
Qty: 30 TABLET | Refills: 0 | Status: SHIPPED | OUTPATIENT
Start: 2021-12-28 | End: 2022-01-11

## 2021-12-28 NOTE — TELEPHONE ENCOUNTER
Last OV 2/4/20  Last PE 12/20/19  Last REFILL   Medication Quantity Refills Start End   LEVOTHYROXINE 25 MCG Oral Tab 30 tablet 0 11/29/2021      Last LABS Cbc 4/28/21    No future appointments. Per PROTOCOL?   Failed     Please Advise

## 2022-01-11 ENCOUNTER — TELEPHONE (OUTPATIENT)
Dept: INTERNAL MEDICINE CLINIC | Facility: CLINIC | Age: 62
End: 2022-01-11

## 2022-01-11 RX ORDER — LEVOTHYROXINE SODIUM 0.03 MG/1
25 TABLET ORAL DAILY
Qty: 30 TABLET | Refills: 0 | Status: SHIPPED | OUTPATIENT
Start: 2022-01-11

## 2022-01-11 NOTE — TELEPHONE ENCOUNTER
Pt stated she never picked up the refill sent in December to local pharm -  pt requesting a refill sent to Pharm in [facility] for the below script. Please advise.     LEVOTHYROXINE 25 MCG Oral Tab 30 tablet 0 12/28/2021     Sig: TAKE 1 TABLET BY MOUTH DAILY    Se

## 2022-04-04 ENCOUNTER — TELEPHONE (OUTPATIENT)
Dept: INTERNAL MEDICINE CLINIC | Facility: CLINIC | Age: 62
End: 2022-04-04

## 2022-04-05 NOTE — TELEPHONE ENCOUNTER
CPE due with [facility]. Call to schedule. GAPs to address. Per last CPE (2019 [facility]) pt has had colonoscopy. Please inquire with who and work to get records.

## 2022-04-05 NOTE — TELEPHONE ENCOUNTER
Future Appointments   Date Time Provider Ann Bray   4/26/2022 11:20 AM Servando Alicea MD [facility]     Orders to edward- Pt informed that labs need to be completed no sooner than 2 weeks prior to the appt.  Pt aware to fast-no call back required

## 2022-04-26 ENCOUNTER — LAB ENCOUNTER (OUTPATIENT)
Dept: LAB | Age: 62
End: 2022-04-26
Attending: INTERNAL MEDICINE
Payer: COMMERCIAL

## 2022-04-26 ENCOUNTER — OFFICE VISIT (OUTPATIENT)
Dept: INTERNAL MEDICINE CLINIC | Facility: CLINIC | Age: 62
End: 2022-04-26
Payer: COMMERCIAL

## 2022-04-26 ENCOUNTER — TELEPHONE (OUTPATIENT)
Dept: INTERNAL MEDICINE CLINIC | Facility: CLINIC | Age: 62
End: 2022-04-26

## 2022-04-26 VITALS
OXYGEN SATURATION: 98 % | BODY MASS INDEX: 33.13 KG/M2 | TEMPERATURE: 98 F | WEIGHT: 187 LBS | RESPIRATION RATE: 18 BRPM | SYSTOLIC BLOOD PRESSURE: 124 MMHG | DIASTOLIC BLOOD PRESSURE: 78 MMHG | HEIGHT: 63 IN | HEART RATE: 80 BPM

## 2022-04-26 DIAGNOSIS — Z13.228 SCREENING FOR METABOLIC DISORDER: ICD-10-CM

## 2022-04-26 DIAGNOSIS — Z00.00 ROUTINE GENERAL MEDICAL EXAMINATION AT A HEALTH CARE FACILITY: ICD-10-CM

## 2022-04-26 DIAGNOSIS — E78.5 DYSLIPIDEMIA: ICD-10-CM

## 2022-04-26 DIAGNOSIS — D75.89 MACROCYTOSIS WITHOUT ANEMIA: ICD-10-CM

## 2022-04-26 DIAGNOSIS — Z13.29 SCREENING FOR THYROID DISORDER: ICD-10-CM

## 2022-04-26 DIAGNOSIS — E03.9 ACQUIRED HYPOTHYROIDISM: ICD-10-CM

## 2022-04-26 DIAGNOSIS — E66.9 CLASS 1 OBESITY: ICD-10-CM

## 2022-04-26 DIAGNOSIS — Z00.00 ROUTINE GENERAL MEDICAL EXAMINATION AT A HEALTH CARE FACILITY: Primary | ICD-10-CM

## 2022-04-26 DIAGNOSIS — Z13.220 SCREENING FOR LIPID DISORDERS: ICD-10-CM

## 2022-04-26 DIAGNOSIS — Z13.0 SCREENING FOR DISORDER OF BLOOD AND BLOOD-FORMING ORGANS: ICD-10-CM

## 2022-04-26 DIAGNOSIS — G25.0 BENIGN ESSENTIAL TREMOR: ICD-10-CM

## 2022-04-26 PROBLEM — E66.811 CLASS 1 OBESITY: Status: ACTIVE | Noted: 2022-04-26

## 2022-04-26 LAB
ALBUMIN SERPL-MCNC: 3.9 G/DL (ref 3.4–5)
ALBUMIN/GLOB SERPL: 0.8 {RATIO} (ref 1–2)
ALP LIVER SERPL-CCNC: 81 U/L
ALT SERPL-CCNC: 22 U/L
ANION GAP SERPL CALC-SCNC: 6 MMOL/L (ref 0–18)
AST SERPL-CCNC: 22 U/L (ref 15–37)
BASOPHILS # BLD AUTO: 0.06 X10(3) UL (ref 0–0.2)
BASOPHILS NFR BLD AUTO: 1 %
BILIRUB SERPL-MCNC: 0.6 MG/DL (ref 0.1–2)
BUN BLD-MCNC: 12 MG/DL (ref 7–18)
CALCIUM BLD-MCNC: 9.1 MG/DL (ref 8.5–10.1)
CHLORIDE SERPL-SCNC: 101 MMOL/L (ref 98–112)
CHOLEST SERPL-MCNC: 195 MG/DL (ref ?–200)
CO2 SERPL-SCNC: 29 MMOL/L (ref 21–32)
CREAT BLD-MCNC: 0.79 MG/DL
EOSINOPHIL # BLD AUTO: 0.21 X10(3) UL (ref 0–0.7)
EOSINOPHIL NFR BLD AUTO: 3.6 %
ERYTHROCYTE [DISTWIDTH] IN BLOOD BY AUTOMATED COUNT: 12 %
FASTING PATIENT LIPID ANSWER: YES
FASTING STATUS PATIENT QL REPORTED: YES
GLOBULIN PLAS-MCNC: 4.7 G/DL (ref 2.8–4.4)
GLUCOSE BLD-MCNC: 97 MG/DL (ref 70–99)
HCT VFR BLD AUTO: 40.4 %
HDLC SERPL-MCNC: 50 MG/DL (ref 40–59)
HGB BLD-MCNC: 13 G/DL
IMM GRANULOCYTES # BLD AUTO: 0.04 X10(3) UL (ref 0–1)
IMM GRANULOCYTES NFR BLD: 0.7 %
LDLC SERPL CALC-MCNC: 122 MG/DL (ref ?–100)
LYMPHOCYTES # BLD AUTO: 2.59 X10(3) UL (ref 1–4)
LYMPHOCYTES NFR BLD AUTO: 44.2 %
MCH RBC QN AUTO: 34.8 PG (ref 26–34)
MCHC RBC AUTO-ENTMCNC: 32.2 G/DL (ref 31–37)
MCV RBC AUTO: 108 FL
MONOCYTES # BLD AUTO: 0.66 X10(3) UL (ref 0.1–1)
MONOCYTES NFR BLD AUTO: 11.3 %
NEUTROPHILS # BLD AUTO: 2.3 X10 (3) UL (ref 1.5–7.7)
NEUTROPHILS # BLD AUTO: 2.3 X10(3) UL (ref 1.5–7.7)
NEUTROPHILS NFR BLD AUTO: 39.2 %
NONHDLC SERPL-MCNC: 145 MG/DL (ref ?–130)
OSMOLALITY SERPL CALC.SUM OF ELEC: 282 MOSM/KG (ref 275–295)
PLATELET # BLD AUTO: 304 10(3)UL (ref 150–450)
POTASSIUM SERPL-SCNC: 4.1 MMOL/L (ref 3.5–5.1)
PROT SERPL-MCNC: 8.6 G/DL (ref 6.4–8.2)
RBC # BLD AUTO: 3.74 X10(6)UL
SODIUM SERPL-SCNC: 136 MMOL/L (ref 136–145)
T4 FREE SERPL-MCNC: 0.6 NG/DL (ref 0.8–1.7)
TRIGL SERPL-MCNC: 129 MG/DL (ref 30–149)
TSI SER-ACNC: 10.6 MIU/ML (ref 0.36–3.74)
VLDLC SERPL CALC-MCNC: 23 MG/DL (ref 0–30)
WBC # BLD AUTO: 5.9 X10(3) UL (ref 4–11)

## 2022-04-26 PROCEDURE — 84439 ASSAY OF FREE THYROXINE: CPT | Performed by: PHYSICIAN ASSISTANT

## 2022-04-26 PROCEDURE — 3078F DIAST BP <80 MM HG: CPT | Performed by: INTERNAL MEDICINE

## 2022-04-26 PROCEDURE — 80050 GENERAL HEALTH PANEL: CPT | Performed by: PHYSICIAN ASSISTANT

## 2022-04-26 PROCEDURE — 80061 LIPID PANEL: CPT | Performed by: PHYSICIAN ASSISTANT

## 2022-04-26 PROCEDURE — 99396 PREV VISIT EST AGE 40-64: CPT | Performed by: INTERNAL MEDICINE

## 2022-04-26 PROCEDURE — 3074F SYST BP LT 130 MM HG: CPT | Performed by: INTERNAL MEDICINE

## 2022-04-26 PROCEDURE — 3008F BODY MASS INDEX DOCD: CPT | Performed by: INTERNAL MEDICINE

## 2022-04-26 RX ORDER — LEVOTHYROXINE SODIUM 0.03 MG/1
25 TABLET ORAL DAILY
Qty: 90 TABLET | Refills: 1 | Status: SHIPPED | OUTPATIENT
Start: 2022-04-26

## 2022-05-24 ENCOUNTER — TELEPHONE (OUTPATIENT)
Dept: INTERNAL MEDICINE CLINIC | Facility: CLINIC | Age: 62
End: 2022-05-24

## 2022-05-24 RX ORDER — DULOXETIN HYDROCHLORIDE 60 MG/1
60 CAPSULE, DELAYED RELEASE ORAL DAILY
Qty: 90 CAPSULE | Refills: 0 | Status: SHIPPED | OUTPATIENT
Start: 2022-05-24 | End: 2022-08-23

## 2022-05-25 RX ORDER — DULOXETIN HYDROCHLORIDE 60 MG/1
CAPSULE, DELAYED RELEASE ORAL
Qty: 90 CAPSULE | Refills: 0 | OUTPATIENT
Start: 2022-05-25

## 2022-08-17 RX ORDER — LEVOTHYROXINE SODIUM 0.03 MG/1
TABLET ORAL
Qty: 90 TABLET | Refills: 1 | Status: SHIPPED | OUTPATIENT
Start: 2022-08-17

## 2022-08-17 NOTE — TELEPHONE ENCOUNTER
Last VISIT 04/26/22    Last CPE 04/26/22    Last REFILL 04/26/22 qty 90 w/1 refill    Last LABS 04/26/22 CPE labs done    No future appointments. Per PROTOCOL? Failed     Please Approve or Deny.

## 2022-08-23 RX ORDER — DULOXETIN HYDROCHLORIDE 60 MG/1
60 CAPSULE, DELAYED RELEASE ORAL DAILY
Qty: 90 CAPSULE | Refills: 0 | Status: SHIPPED | OUTPATIENT
Start: 2022-08-23

## 2022-08-23 NOTE — TELEPHONE ENCOUNTER
Last VISIT 04/26/22    Last CPE 04/26/22    Last REFILL 05/24/22 qty 90 w/0 refills    Last LABS 04/26/22 CPE labs done    No future appointments. Please Approve or Deny.

## 2022-11-15 RX ORDER — DULOXETIN HYDROCHLORIDE 60 MG/1
CAPSULE, DELAYED RELEASE ORAL
Qty: 90 CAPSULE | Refills: 0 | Status: SHIPPED | OUTPATIENT
Start: 2022-11-15

## 2022-11-19 ENCOUNTER — MED REC SCAN ONLY (OUTPATIENT)
Dept: INTERNAL MEDICINE CLINIC | Facility: CLINIC | Age: 62
End: 2022-11-19

## 2022-12-09 ENCOUNTER — OFFICE VISIT (OUTPATIENT)
Dept: INTERNAL MEDICINE CLINIC | Facility: CLINIC | Age: 62
End: 2022-12-09
Payer: COMMERCIAL

## 2022-12-09 VITALS
SYSTOLIC BLOOD PRESSURE: 124 MMHG | OXYGEN SATURATION: 99 % | BODY MASS INDEX: 32.96 KG/M2 | DIASTOLIC BLOOD PRESSURE: 66 MMHG | HEART RATE: 96 BPM | HEIGHT: 63 IN | TEMPERATURE: 97 F | WEIGHT: 186 LBS | RESPIRATION RATE: 18 BRPM

## 2022-12-09 DIAGNOSIS — Z12.11 SCREEN FOR COLON CANCER: ICD-10-CM

## 2022-12-09 DIAGNOSIS — F32.A ANXIETY AND DEPRESSION: ICD-10-CM

## 2022-12-09 DIAGNOSIS — D75.89 MACROCYTOSIS WITHOUT ANEMIA: ICD-10-CM

## 2022-12-09 DIAGNOSIS — E78.00 HYPERCHOLESTEREMIA: ICD-10-CM

## 2022-12-09 DIAGNOSIS — E03.9 ACQUIRED HYPOTHYROIDISM: Primary | ICD-10-CM

## 2022-12-09 DIAGNOSIS — F41.9 ANXIETY AND DEPRESSION: ICD-10-CM

## 2022-12-09 PROCEDURE — 3008F BODY MASS INDEX DOCD: CPT | Performed by: INTERNAL MEDICINE

## 2022-12-09 PROCEDURE — 99214 OFFICE O/P EST MOD 30 MIN: CPT | Performed by: INTERNAL MEDICINE

## 2022-12-09 PROCEDURE — 3078F DIAST BP <80 MM HG: CPT | Performed by: INTERNAL MEDICINE

## 2022-12-09 PROCEDURE — 3074F SYST BP LT 130 MM HG: CPT | Performed by: INTERNAL MEDICINE

## 2022-12-09 PROCEDURE — 90471 IMMUNIZATION ADMIN: CPT | Performed by: INTERNAL MEDICINE

## 2022-12-09 PROCEDURE — 90686 IIV4 VACC NO PRSV 0.5 ML IM: CPT | Performed by: INTERNAL MEDICINE

## 2022-12-09 RX ORDER — EZETIMIBE 10 MG/1
10 TABLET ORAL DAILY
COMMUNITY
Start: 2022-10-18

## 2022-12-29 ENCOUNTER — LAB ENCOUNTER (OUTPATIENT)
Dept: LAB | Age: 62
End: 2022-12-29
Attending: INTERNAL MEDICINE
Payer: COMMERCIAL

## 2022-12-29 DIAGNOSIS — E78.00 HYPERCHOLESTEREMIA: ICD-10-CM

## 2022-12-29 DIAGNOSIS — D75.89 MACROCYTOSIS WITHOUT ANEMIA: ICD-10-CM

## 2022-12-29 DIAGNOSIS — E03.9 ACQUIRED HYPOTHYROIDISM: ICD-10-CM

## 2022-12-29 LAB
ALBUMIN SERPL-MCNC: 3.9 G/DL (ref 3.4–5)
ALBUMIN/GLOB SERPL: 0.8 {RATIO} (ref 1–2)
ALP LIVER SERPL-CCNC: 87 U/L
ALT SERPL-CCNC: 25 U/L
ANION GAP SERPL CALC-SCNC: 7 MMOL/L (ref 0–18)
AST SERPL-CCNC: 23 U/L (ref 15–37)
BASOPHILS # BLD AUTO: 0.05 X10(3) UL (ref 0–0.2)
BASOPHILS NFR BLD AUTO: 0.8 %
BILIRUB SERPL-MCNC: 0.5 MG/DL (ref 0.1–2)
BUN BLD-MCNC: 16 MG/DL (ref 7–18)
CALCIUM BLD-MCNC: 9.5 MG/DL (ref 8.5–10.1)
CHLORIDE SERPL-SCNC: 102 MMOL/L (ref 98–112)
CHOLEST SERPL-MCNC: 288 MG/DL (ref ?–200)
CO2 SERPL-SCNC: 30 MMOL/L (ref 21–32)
CREAT BLD-MCNC: 0.85 MG/DL
EOSINOPHIL # BLD AUTO: 0.26 X10(3) UL (ref 0–0.7)
EOSINOPHIL NFR BLD AUTO: 4.1 %
ERYTHROCYTE [DISTWIDTH] IN BLOOD BY AUTOMATED COUNT: 12.2 %
FASTING PATIENT LIPID ANSWER: YES
FASTING STATUS PATIENT QL REPORTED: YES
GFR SERPLBLD BASED ON 1.73 SQ M-ARVRAT: 77 ML/MIN/1.73M2 (ref 60–?)
GLOBULIN PLAS-MCNC: 4.9 G/DL (ref 2.8–4.4)
GLUCOSE BLD-MCNC: 100 MG/DL (ref 70–99)
HCT VFR BLD AUTO: 41.3 %
HDLC SERPL-MCNC: 46 MG/DL (ref 40–59)
HGB BLD-MCNC: 13.6 G/DL
IMM GRANULOCYTES # BLD AUTO: 0.02 X10(3) UL (ref 0–1)
IMM GRANULOCYTES NFR BLD: 0.3 %
LDLC SERPL CALC-MCNC: 194 MG/DL (ref ?–100)
LYMPHOCYTES # BLD AUTO: 3 X10(3) UL (ref 1–4)
LYMPHOCYTES NFR BLD AUTO: 47.7 %
MCH RBC QN AUTO: 35.2 PG (ref 26–34)
MCHC RBC AUTO-ENTMCNC: 32.9 G/DL (ref 31–37)
MCV RBC AUTO: 107 FL
MONOCYTES # BLD AUTO: 0.7 X10(3) UL (ref 0.1–1)
MONOCYTES NFR BLD AUTO: 11.1 %
NEUTROPHILS # BLD AUTO: 2.26 X10 (3) UL (ref 1.5–7.7)
NEUTROPHILS # BLD AUTO: 2.26 X10(3) UL (ref 1.5–7.7)
NEUTROPHILS NFR BLD AUTO: 36 %
NONHDLC SERPL-MCNC: 242 MG/DL (ref ?–130)
OSMOLALITY SERPL CALC.SUM OF ELEC: 289 MOSM/KG (ref 275–295)
PLATELET # BLD AUTO: 304 10(3)UL (ref 150–450)
POTASSIUM SERPL-SCNC: 4.2 MMOL/L (ref 3.5–5.1)
PROT SERPL-MCNC: 8.8 G/DL (ref 6.4–8.2)
RBC # BLD AUTO: 3.86 X10(6)UL
SODIUM SERPL-SCNC: 139 MMOL/L (ref 136–145)
T4 FREE SERPL-MCNC: 0.6 NG/DL (ref 0.8–1.7)
TRIGL SERPL-MCNC: 246 MG/DL (ref 30–149)
TSI SER-ACNC: 8.65 MIU/ML (ref 0.36–3.74)
VLDLC SERPL CALC-MCNC: 53 MG/DL (ref 0–30)
WBC # BLD AUTO: 6.3 X10(3) UL (ref 4–11)

## 2022-12-29 PROCEDURE — 84439 ASSAY OF FREE THYROXINE: CPT | Performed by: INTERNAL MEDICINE

## 2022-12-29 PROCEDURE — 80061 LIPID PANEL: CPT | Performed by: INTERNAL MEDICINE

## 2022-12-29 PROCEDURE — 80050 GENERAL HEALTH PANEL: CPT | Performed by: INTERNAL MEDICINE

## 2023-02-13 ENCOUNTER — TELEPHONE (OUTPATIENT)
Dept: INTERNAL MEDICINE CLINIC | Facility: CLINIC | Age: 63
End: 2023-02-13

## 2023-02-13 RX ORDER — LEVOTHYROXINE SODIUM 0.03 MG/1
TABLET ORAL
Qty: 90 TABLET | Refills: 1 | Status: SHIPPED | OUTPATIENT
Start: 2023-02-13

## 2023-02-13 RX ORDER — DULOXETIN HYDROCHLORIDE 60 MG/1
CAPSULE, DELAYED RELEASE ORAL
Qty: 90 CAPSULE | Refills: 0 | Status: SHIPPED | OUTPATIENT
Start: 2023-02-13

## 2023-02-13 NOTE — TELEPHONE ENCOUNTER
Last visit- 04/26/2022 cpe seen by AD    Last refill- 08/17/2022 levothyroxine 25mcg QTY90 1R    Last labs- 12/29/2022 t4 free, tsh     No future appointments.     Per Protocol- Passed

## 2023-02-13 NOTE — TELEPHONE ENCOUNTER
Pt is Darien Munoz.  She needs below script sent to:    [address] [facility], [address] [place] [id number] [place] [address] [address], [phone], [address] [place] [address]   Phone: [phone] Fax: [phone]   Hours: Not open 24 hours

## 2023-02-13 NOTE — TELEPHONE ENCOUNTER
Pt is in [facility], she needs below script sent to:    Gina Keys [address], [address] [facility], [phone], [address] [place] [address] Drive [address]   Phone: [phone] Fax: [phone]   Hours: Not open 24 hours

## 2023-02-13 NOTE — TELEPHONE ENCOUNTER
Last visit- 04/26/2022 cpe seen by AD    Last refill- 11/15/2022 duloxetine 60mg QTY90 0R    Last labs- 12/29/2022 t4 free, cbc, tsh, cmp, lipid     No future appointments. Per Protocol?   Please approve or deny

## 2023-03-28 DIAGNOSIS — E03.9 ACQUIRED HYPOTHYROIDISM: Primary | ICD-10-CM

## 2023-03-29 DIAGNOSIS — E03.9 ACQUIRED HYPOTHYROIDISM: Primary | ICD-10-CM

## 2023-03-29 RX ORDER — LEVOTHYROXINE SODIUM 0.07 MG/1
75 TABLET ORAL
Qty: 90 TABLET | Refills: 0 | Status: CANCELLED | OUTPATIENT
Start: 2023-03-29

## 2023-03-29 RX ORDER — LEVOTHYROXINE SODIUM 0.07 MG/1
TABLET ORAL
Qty: 90 TABLET | Refills: 0 | Status: SHIPPED | OUTPATIENT
Start: 2023-03-29

## 2023-05-08 ENCOUNTER — TELEPHONE (OUTPATIENT)
Dept: INTERNAL MEDICINE CLINIC | Facility: CLINIC | Age: 63
End: 2023-05-08

## 2023-05-10 RX ORDER — DULOXETIN HYDROCHLORIDE 60 MG/1
60 CAPSULE, DELAYED RELEASE ORAL DAILY
Qty: 90 CAPSULE | Refills: 0 | Status: SHIPPED | OUTPATIENT
Start: 2023-05-10

## 2023-05-12 RX ORDER — DULOXETIN HYDROCHLORIDE 60 MG/1
60 CAPSULE, DELAYED RELEASE ORAL DAILY
Qty: 90 CAPSULE | Refills: 0 | Status: CANCELLED | OUTPATIENT
Start: 2023-05-12

## 2023-06-08 ENCOUNTER — ORDER TRANSCRIPTION (OUTPATIENT)
Dept: ADMINISTRATIVE | Facility: HOSPITAL | Age: 63
End: 2023-06-08

## 2023-06-08 DIAGNOSIS — Z13.6 SCREENING FOR CARDIOVASCULAR CONDITION: Primary | ICD-10-CM

## 2023-06-14 ENCOUNTER — HOSPITAL ENCOUNTER (OUTPATIENT)
Dept: CT IMAGING | Age: 63
Discharge: HOME | End: 2023-06-14
Attending: INTERNAL MEDICINE

## 2023-06-14 VITALS — BODY MASS INDEX: 31.41 KG/M2 | HEIGHT: 64 IN | WEIGHT: 184 LBS

## 2023-06-14 DIAGNOSIS — Z13.6 SCREENING FOR CARDIOVASCULAR CONDITION: ICD-10-CM

## 2023-06-14 LAB
GLUCOSE POC: 82 MG/DL (ref 70–100)
HDL POC: 49 MG/DL (ref 40–55)
LDL POC: 131 MG/DL (ref 0–100)
TC/HDL RATIO: 4 (ref 0–5)
TOTAL CHOLESTEROL POC: 208 MG/DL (ref 0–200)
TRIGLYCERIDES POC: 142 MG/DL (ref 0–150)

## 2023-06-16 ENCOUNTER — OFFICE VISIT (OUTPATIENT)
Dept: NEUROLOGY | Facility: CLINIC | Age: 63
End: 2023-06-16
Payer: COMMERCIAL

## 2023-06-16 ENCOUNTER — TELEPHONE (OUTPATIENT)
Dept: NEUROLOGY | Facility: CLINIC | Age: 63
End: 2023-06-16

## 2023-06-16 VITALS
DIASTOLIC BLOOD PRESSURE: 70 MMHG | BODY MASS INDEX: 32 KG/M2 | RESPIRATION RATE: 16 BRPM | SYSTOLIC BLOOD PRESSURE: 122 MMHG | WEIGHT: 188 LBS | HEART RATE: 104 BPM

## 2023-06-16 DIAGNOSIS — G25.0 BENIGN ESSENTIAL TREMOR: Primary | ICD-10-CM

## 2023-06-16 PROCEDURE — 99204 OFFICE O/P NEW MOD 45 MIN: CPT | Performed by: OTHER

## 2023-06-16 PROCEDURE — 3074F SYST BP LT 130 MM HG: CPT | Performed by: OTHER

## 2023-06-16 PROCEDURE — 3078F DIAST BP <80 MM HG: CPT | Performed by: OTHER

## 2023-06-16 RX ORDER — PROPRANOLOL HYDROCHLORIDE 20 MG/1
20 TABLET ORAL 2 TIMES DAILY
Qty: 60 TABLET | Refills: 2 | Status: SHIPPED | OUTPATIENT
Start: 2023-06-16

## 2023-06-16 NOTE — TELEPHONE ENCOUNTER
Pt has new script from Dr Michael Novak for tremors, pt was going to have a glass of wine but saw on script to check with provider before having with alcohol. Transferred to nurse.

## 2023-06-23 DIAGNOSIS — E03.9 ACQUIRED HYPOTHYROIDISM: ICD-10-CM

## 2023-06-23 RX ORDER — LEVOTHYROXINE SODIUM 0.07 MG/1
TABLET ORAL
Qty: 90 TABLET | Refills: 0 | Status: SHIPPED | OUTPATIENT
Start: 2023-06-23

## 2023-06-27 ENCOUNTER — TELEPHONE (OUTPATIENT)
Dept: NEUROLOGY | Facility: CLINIC | Age: 63
End: 2023-06-27

## 2023-06-27 NOTE — TELEPHONE ENCOUNTER
Patient states she feels fine. No other side effects. The pharmacist told her to stop the medication and call. She stopped on Friday and today the hoarsness is not better.

## 2023-06-27 NOTE — TELEPHONE ENCOUNTER
Pt has developed a hoarse voice since she has been taking the propranolol 20 MG Oral Tab. Her pharmacist thought it could be bronchial spasms. They recommended she stop taking it and contact her provider., She stopped taking it on Friday, 6/23. There has been minimal improvement. Please advise how she should proceed. Her best call back number is [phone]. Endorsed to RN for Provider.

## 2023-08-01 ENCOUNTER — LAB ENCOUNTER (OUTPATIENT)
Dept: LAB | Age: 63
End: 2023-08-01
Attending: INTERNAL MEDICINE
Payer: COMMERCIAL

## 2023-08-01 DIAGNOSIS — E78.00 PURE HYPERCHOLESTEROLEMIA: Primary | ICD-10-CM

## 2023-08-01 DIAGNOSIS — E78.00 HYPERCHOLESTEREMIA: ICD-10-CM

## 2023-08-01 DIAGNOSIS — E03.9 ACQUIRED HYPOTHYROIDISM: ICD-10-CM

## 2023-08-01 DIAGNOSIS — E05.90 HYPERTHYROIDISM: ICD-10-CM

## 2023-08-01 LAB
ALBUMIN SERPL-MCNC: 3.9 G/DL (ref 3.4–5)
ALBUMIN/GLOB SERPL: 0.8 {RATIO} (ref 1–2)
ALP LIVER SERPL-CCNC: 92 U/L
ALT SERPL-CCNC: 24 U/L
ANION GAP SERPL CALC-SCNC: 6 MMOL/L (ref 0–18)
AST SERPL-CCNC: 23 U/L (ref 15–37)
BILIRUB SERPL-MCNC: 0.5 MG/DL (ref 0.1–2)
BUN BLD-MCNC: 11 MG/DL (ref 7–18)
CALCIUM BLD-MCNC: 9.8 MG/DL (ref 8.5–10.1)
CHLORIDE SERPL-SCNC: 101 MMOL/L (ref 98–112)
CHOLEST SERPL-MCNC: 218 MG/DL (ref ?–200)
CK SERPL-CCNC: 85 U/L
CO2 SERPL-SCNC: 29 MMOL/L (ref 21–32)
CREAT BLD-MCNC: 0.95 MG/DL
EGFRCR SERPLBLD CKD-EPI 2021: 67 ML/MIN/1.73M2 (ref 60–?)
FASTING PATIENT LIPID ANSWER: YES
FASTING STATUS PATIENT QL REPORTED: YES
GLOBULIN PLAS-MCNC: 5 G/DL (ref 2.8–4.4)
GLUCOSE BLD-MCNC: 106 MG/DL (ref 70–99)
HDLC SERPL-MCNC: 48 MG/DL (ref 40–59)
LDLC SERPL CALC-MCNC: 137 MG/DL (ref ?–100)
NONHDLC SERPL-MCNC: 170 MG/DL (ref ?–130)
OSMOLALITY SERPL CALC.SUM OF ELEC: 282 MOSM/KG (ref 275–295)
POTASSIUM SERPL-SCNC: 3.6 MMOL/L (ref 3.5–5.1)
PROT SERPL-MCNC: 8.9 G/DL (ref 6.4–8.2)
SODIUM SERPL-SCNC: 136 MMOL/L (ref 136–145)
TRIGL SERPL-MCNC: 184 MG/DL (ref 30–149)
TSI SER-ACNC: 1.77 MIU/ML (ref 0.36–3.74)
VLDLC SERPL CALC-MCNC: 34 MG/DL (ref 0–30)

## 2023-08-01 PROCEDURE — 36415 COLL VENOUS BLD VENIPUNCTURE: CPT

## 2023-08-01 PROCEDURE — 84443 ASSAY THYROID STIM HORMONE: CPT

## 2023-08-01 PROCEDURE — 80061 LIPID PANEL: CPT

## 2023-08-01 PROCEDURE — 82550 ASSAY OF CK (CPK): CPT

## 2023-08-01 PROCEDURE — 80053 COMPREHEN METABOLIC PANEL: CPT

## 2023-08-11 RX ORDER — DULOXETIN HYDROCHLORIDE 60 MG/1
60 CAPSULE, DELAYED RELEASE ORAL DAILY
Qty: 90 CAPSULE | Refills: 0 | Status: SHIPPED | OUTPATIENT
Start: 2023-08-11

## 2023-08-11 NOTE — TELEPHONE ENCOUNTER
Which [facility]    Prescription Refill Request - Patient advised can take 48-72 hours.       Name of Medication (strength, dose, qty requested:     DULoxetine 60 MG Oral Cap DR Particles

## 2023-08-11 NOTE — TELEPHONE ENCOUNTER
Requested Prescriptions     Pending Prescriptions Disp Refills    DULoxetine 60 MG Oral Cap DR Particles 90 capsule 0     Sig: Take 1 capsule (60 mg total) by mouth daily. LOV:12/9/22    A. D-Anxiety and depression:  Stable/controlled  Continue Duloxetine 60 mg daily  No longer following with psychiatry service    LAST PHYSICAL: 4/26/22    LAST REFILL: duloxetine-90-5/10/23    LAST LAB: 8/1/23    Your Appointments      Friday October 06, 2023  1:00 PM  Physical - Established with Mike Garibay MD  [address] ([facility]) [place] [address] [phone]        Monday December 18, 2023  1:30 PM  Follow Up Visit with Bea Aguila MD  [address], [address] [place] [address] (Parth Morales) [address] [place] [address] [phone]

## 2023-09-21 DIAGNOSIS — E03.9 ACQUIRED HYPOTHYROIDISM: ICD-10-CM

## 2023-09-22 RX ORDER — LEVOTHYROXINE SODIUM 0.07 MG/1
75 TABLET ORAL
Qty: 90 TABLET | Refills: 0 | Status: SHIPPED | OUTPATIENT
Start: 2023-09-22

## 2023-09-22 NOTE — TELEPHONE ENCOUNTER
Last OV? 12/09/2022    Last CPE? 04/26/2022    Last Refill? Medication Quantity Refills Start End   LEVOTHYROXINE 75 MCG Oral Tab 90 tablet 0 6/23/2023    Sig:   TAKE 1 TABLET(75 MCG) BY MOUTH BEFORE BREAKFAST       Last Labs? TSH w Reflex- 08/01/2023    Future Appointments   Date Time Provider Ann Katarina   10/6/2023  1:00 PM Kathya Abdul MD [facility]   12/18/2023  1:30 PM Sera Bell MD [facility]     Per Protocol?    Refill pended    Please Approve or Deny

## 2023-10-06 ENCOUNTER — OFFICE VISIT (OUTPATIENT)
Dept: INTERNAL MEDICINE CLINIC | Facility: CLINIC | Age: 63
End: 2023-10-06
Payer: COMMERCIAL

## 2023-10-06 VITALS
OXYGEN SATURATION: 99 % | BODY MASS INDEX: 33.07 KG/M2 | DIASTOLIC BLOOD PRESSURE: 80 MMHG | HEIGHT: 63 IN | HEART RATE: 85 BPM | SYSTOLIC BLOOD PRESSURE: 116 MMHG | WEIGHT: 186.63 LBS

## 2023-10-06 DIAGNOSIS — F32.A ANXIETY AND DEPRESSION: ICD-10-CM

## 2023-10-06 DIAGNOSIS — Z00.00 ROUTINE GENERAL MEDICAL EXAMINATION AT A HEALTH CARE FACILITY: Primary | ICD-10-CM

## 2023-10-06 DIAGNOSIS — E03.9 ACQUIRED HYPOTHYROIDISM: ICD-10-CM

## 2023-10-06 DIAGNOSIS — D75.89 MACROCYTOSIS WITHOUT ANEMIA: ICD-10-CM

## 2023-10-06 DIAGNOSIS — F41.9 ANXIETY AND DEPRESSION: ICD-10-CM

## 2023-10-06 DIAGNOSIS — G25.0 BENIGN ESSENTIAL TREMOR: ICD-10-CM

## 2023-10-06 DIAGNOSIS — R73.03 PREDIABETES: ICD-10-CM

## 2023-10-06 DIAGNOSIS — E78.2 MIXED HYPERLIPIDEMIA: ICD-10-CM

## 2023-10-06 DIAGNOSIS — E66.9 CLASS 1 OBESITY: ICD-10-CM

## 2023-10-06 PROCEDURE — 3079F DIAST BP 80-89 MM HG: CPT | Performed by: INTERNAL MEDICINE

## 2023-10-06 PROCEDURE — 3008F BODY MASS INDEX DOCD: CPT | Performed by: INTERNAL MEDICINE

## 2023-10-06 PROCEDURE — 3074F SYST BP LT 130 MM HG: CPT | Performed by: INTERNAL MEDICINE

## 2023-10-06 PROCEDURE — 99396 PREV VISIT EST AGE 40-64: CPT | Performed by: INTERNAL MEDICINE

## 2023-10-17 ENCOUNTER — TELEPHONE (OUTPATIENT)
Dept: INTERNAL MEDICINE CLINIC | Facility: CLINIC | Age: 63
End: 2023-10-17

## 2023-10-17 NOTE — TELEPHONE ENCOUNTER
Patient calling with her colonoscopy information.     Done 5 years ago  Sky Cortés MD at [address]

## 2023-11-16 RX ORDER — DULOXETIN HYDROCHLORIDE 60 MG/1
60 CAPSULE, DELAYED RELEASE ORAL DAILY
Qty: 90 CAPSULE | Refills: 0 | Status: SHIPPED | OUTPATIENT
Start: 2023-11-16

## 2023-11-16 NOTE — TELEPHONE ENCOUNTER
Requested Prescriptions     Pending Prescriptions Disp Refills    DULoxetine 60 MG Oral Cap DR Particles 90 capsule 0     Sig: Take 1 capsule (60 mg total) by mouth daily.        LOV: 10/6/23    LAST PHYSICAL: 10/6/23    LAST REFILL: duloxetine-90-8/11/23    LAST LAB: 8/1/23    Your Appointments      Monday December 18, 2023  1:30 PM  Follow Up Visit with MD Shaye Cruz, [address] [place] [address] ([address] [place], [place] [phone]        Tuesday April 02, 2024  1:00 PM  Exam - [facility] ([facility] [address]) [address] [place], [place] [address] [phone]

## 2023-12-26 DIAGNOSIS — E03.9 ACQUIRED HYPOTHYROIDISM: ICD-10-CM

## 2023-12-26 RX ORDER — LEVOTHYROXINE SODIUM 0.07 MG/1
75 TABLET ORAL
Qty: 90 TABLET | Refills: 0 | Status: SHIPPED | OUTPATIENT
Start: 2023-12-26

## 2023-12-26 NOTE — TELEPHONE ENCOUNTER
Last OV? 10/06/2023    Last CPE? 10/06/2023    Last Refill? Medication Quantity Refills Start End   levothyroxine 75 MCG Oral Tab 90 tablet 0 9/22/2023 --   Sig:   Take 1 tablet (75 mcg total) by mouth before breakfast.       Last Labs? TSH w Reflex- 08/01/2023    Future Appointments   Date Time Provider Ann Bray   4/2/2024  1:00 PM PATRICIO Abarca [facility]     Per Protocol?    Met- Refill sent for 90 days

## 2024-02-04 DIAGNOSIS — G25.0 BENIGN ESSENTIAL TREMOR: Primary | ICD-10-CM

## 2024-02-05 RX ORDER — PROPRANOLOL HYDROCHLORIDE 20 MG/1
20 TABLET ORAL 2 TIMES DAILY
Qty: 60 TABLET | Refills: 2 | Status: SHIPPED | OUTPATIENT
Start: 2024-02-05

## 2024-02-05 NOTE — TELEPHONE ENCOUNTER
Medication: PROPRANOLOL 20 MG Oral Tab      Date of last refill: 06/16/2023 (#60/2)  [place]     Last office visit: 06/16/2023  Due back to clinic per last office note:  Around 12/16/2023  Date next office visit scheduled:    Future Appointments   Date Time Provider Department Center   4/2/2024  1:00 PM Lola Arthur APRN [facility]           Last OV note recommendation:    ASSESSMENT/PLAN:      (G25.0) Benign essential tremor  (primary encounter diagnosis)     Patient is a 62-year-old female who presented with progressing hand tremors  Has a history of benign essential tremors     Plan: Start propanolol 20 mg twice daily  If experiencing any side effects can lower it down to 10 mg twice daily     Counseled on nonpharmacological intervention     Follow-up in about 6 months        Thank you for allowing us to participate in your patient's care.    Total 45 minutes spent with patient >50% of visit was spent in counseling and coordination of care        Esvin Jaeger MD  [facility] [place]

## 2024-02-06 RX ORDER — DULOXETIN HYDROCHLORIDE 60 MG/1
60 CAPSULE, DELAYED RELEASE ORAL DAILY
Qty: 90 CAPSULE | Refills: 3 | Status: SHIPPED | OUTPATIENT
Start: 2024-02-06

## 2024-03-20 DIAGNOSIS — E03.9 ACQUIRED HYPOTHYROIDISM: ICD-10-CM

## 2024-03-21 RX ORDER — LEVOTHYROXINE SODIUM 0.07 MG/1
75 TABLET ORAL
Qty: 90 TABLET | Refills: 0 | Status: SHIPPED | OUTPATIENT
Start: 2024-03-21

## 2024-04-02 ENCOUNTER — TELEPHONE (OUTPATIENT)
Dept: INTERNAL MEDICINE CLINIC | Facility: CLINIC | Age: 64
End: 2024-04-02

## 2024-04-05 ENCOUNTER — OFFICE VISIT (OUTPATIENT)
Dept: INTERNAL MEDICINE CLINIC | Facility: CLINIC | Age: 64
End: 2024-04-05
Payer: COMMERCIAL

## 2024-04-05 VITALS
BODY MASS INDEX: 32.83 KG/M2 | SYSTOLIC BLOOD PRESSURE: 110 MMHG | HEIGHT: 62.5 IN | OXYGEN SATURATION: 96 % | HEART RATE: 72 BPM | RESPIRATION RATE: 18 BRPM | WEIGHT: 183 LBS | DIASTOLIC BLOOD PRESSURE: 70 MMHG

## 2024-04-05 DIAGNOSIS — E66.9 CLASS 1 OBESITY WITH SERIOUS COMORBIDITY AND BODY MASS INDEX (BMI) OF 32.0 TO 32.9 IN ADULT, UNSPECIFIED OBESITY TYPE: ICD-10-CM

## 2024-04-05 DIAGNOSIS — E03.9 ACQUIRED HYPOTHYROIDISM: ICD-10-CM

## 2024-04-05 DIAGNOSIS — M47.816 OSTEOARTHRITIS OF LUMBAR SPINE, UNSPECIFIED SPINAL OSTEOARTHRITIS COMPLICATION STATUS: ICD-10-CM

## 2024-04-05 DIAGNOSIS — Z51.81 ENCOUNTER FOR THERAPEUTIC DRUG LEVEL MONITORING: Primary | ICD-10-CM

## 2024-04-05 DIAGNOSIS — E78.2 MIXED HYPERLIPIDEMIA: ICD-10-CM

## 2024-04-05 DIAGNOSIS — R73.03 PREDIABETES: ICD-10-CM

## 2024-04-05 PROCEDURE — 3078F DIAST BP <80 MM HG: CPT | Performed by: PHYSICIAN ASSISTANT

## 2024-04-05 PROCEDURE — 3074F SYST BP LT 130 MM HG: CPT | Performed by: PHYSICIAN ASSISTANT

## 2024-04-05 PROCEDURE — 3008F BODY MASS INDEX DOCD: CPT | Performed by: PHYSICIAN ASSISTANT

## 2024-04-05 PROCEDURE — 99204 OFFICE O/P NEW MOD 45 MIN: CPT | Performed by: PHYSICIAN ASSISTANT

## 2024-04-05 RX ORDER — METFORMIN HYDROCHLORIDE 500 MG/1
500 TABLET, EXTENDED RELEASE ORAL DAILY
Qty: 90 TABLET | Refills: 0 | Status: SHIPPED | OUTPATIENT
Start: 2024-04-05

## 2024-04-05 RX ORDER — EZETIMIBE 10 MG/1
10 TABLET ORAL DAILY
COMMUNITY
Start: 2023-12-19

## 2024-04-05 NOTE — PROGRESS NOTES
HISTORY OF PRESENT ILLNESS  Chief Complaint   Patient presents with    Weight Problem     Ref by pcp,open to meds,no prior [facility] management       yL Pelaez is a 63 year old female new to our office today for initiation of medical weight loss program.  Patient presents today with c/o excess weight.       Feels like she has struggled with weight her whole life  Does enjoy to go out to dinner, desserts  Struggles with good food choices when going out to dinner    Reason/goal for weight loss: lose and maintain weight loss    Previous weight loss efforts in the past/medication: different diets, Ashley Light, Soraya Manteca    Interest in Bariatric surgery:     Barriers to weight loss:     Wt Readings from Last 6 Encounters:   04/05/24 183 lb (83 kg)   10/06/23 186 lb 9.6 oz (84.6 kg)   06/16/23 188 lb (85.3 kg)   06/14/23 184 lb (83.5 kg)   12/09/22 186 lb (84.4 kg)   04/26/22 187 lb (84.8 kg)          Breakfast Lunch Dinner Snacks Fluids   Atkins bar  Starbucks egg bites Skips  Or atkins snack bar Healthy choice Wine   Snacky foods   Flavored water  wine     Social hx and lifestyle reviewed:    Work: teacher, substitute  Marital status: Yes  Support: Yes  Tobacco use: None  ETOH use: glass of wine at night  Supplements: Fiber gummies  Exercise: jazzercise - 3 days a week, rides bike, walks dog  Stress level: 5/10 - fluctuates  Sleep hours and integrity: 8-9hrs a night    MEDICAL HISTORY  PMH reviewed:   Cardiac disorders:negative    Depression/anxiety: Yes  Glaucoma: negative   Kidney stones: negative   Eating disorder: negative   Migraines: negative   Seizures: negative   Joint-related conditions: low back DJD  Liver disease: negative   Renal disease: negative   Diabetes: IFG  Thyroid disease: Yes  Constipation: negative  Miscarriage: negative   DVT: negative    Family or personal history of Pancreatic issues / Medullary Thyroid Cancer: Father thyroid cancer  History of bariatric surgery: negative     [facility]  reviewed: obesity in parent/s or sibling:     REVIEW OF SYSTEMS  GENERAL: feels well otherwise  NECK: denies thickening   LUNGS: denies shortness of breath with exertion, no apnea   CARDIOVASCULAR: denies chest pain on exertion , denies palpitations or pedal edema  GI: denies abdominal pain.  No N/V/D/C  MUSCULOSKELETAL: see above  NEURO: denies headaches   PSYCH: denies change in behavior or mood, denies feeling sad or depressed     EXAM  /70   Pulse 72   Resp 18   Ht 5' 2.5\" (1.588 m)   Wt 183 lb (83 kg)   LMP  (LMP Unknown)   SpO2 96%   BMI 32.94 kg/m² , Percent body fat: F >32%        GENERAL: well developed, well nourished, in no apparent distress, essential tremor  SKIN: warm, pink, dry without rashes to exposed area   EYES: conjunctiva pink, sclera non icteric, PERRL  HEENT: atraumatic, normocephalic, O/p: Mallampati score-   NECK: supple, non tender, no adenopathy, no thyromegaly  LUNGS: CTA in all fields, breathing non labored  CARDIO: RRR without murmur, normal S1 and S2 without clicks or gallops, no pedal edema. EKG not performed in office  GI: rotund, no masses, HSM or tenderness  MUSCULOSKELETAL: grossly intact   NEURO: Oriented times three, full ROM of bilateral UE/LE  PSYCH: pleasant, cooperative, normal mood and affect    Lab Results   Component Value Date    WBC 6.3 12/29/2022    RBC 3.86 12/29/2022    HGB 13.6 12/29/2022    HCT 41.3 12/29/2022    .0 (H) 12/29/2022    MCH 35.2 (H) 12/29/2022    MCHC 32.9 12/29/2022    RDW 12.2 12/29/2022    .0 12/29/2022     Lab Results   Component Value Date     (H) 08/01/2023    BUN 11 08/01/2023    BUNCREA 11.1 10/20/2020    CREATSERUM 0.95 08/01/2023    ANIONGAP 6 08/01/2023    GFRNAA 80 04/26/2022    GFRAA 93 04/26/2022    CA 9.8 08/01/2023    OSMOCALC 282 08/01/2023    ALKPHO 92 08/01/2023    AST 23 08/01/2023    ALT 24 08/01/2023    BILT 0.5 08/01/2023    TP 8.9 (H) 08/01/2023    ALB 3.9 08/01/2023    GLOBULIN 5.0 (H)  08/01/2023     08/01/2023    K 3.6 08/01/2023     08/01/2023    CO2 29.0 08/01/2023     Lab Results   Component Value Date     02/03/2020    A1C 5.6 02/03/2020     Lab Results   Component Value Date    CHOLEST 218 (H) 08/01/2023    TRIG 184 (H) 08/01/2023    HDL 48 08/01/2023     (H) 08/01/2023    VLDL 34 (H) 08/01/2023    NONHDLC 170 (H) 08/01/2023    CHOLHDLRATIO 4 06/14/2023     Lab Results   Component Value Date    T4F 0.6 (L) 12/29/2022    TSH 1.770 08/01/2023     Lab Results   Component Value Date    B12 1,350 (H) 02/03/2020     No results found for: \"VITD\", \"QVITD\", \"LGYK10DF\"    Current Outpatient Medications on File Prior to Visit   Medication Sig Dispense Refill    ezetimibe 10 MG Oral Tab Take 1 tablet (10 mg total) by mouth daily.      LEVOTHYROXINE 75 MCG Oral Tab TAKE 1 TABLET(75 MCG) BY MOUTH BEFORE BREAKFAST 90 tablet 0    DULoxetine 60 MG Oral Cap DR Particles Take 1 capsule (60 mg total) by mouth daily. 90 capsule 3    propranolol 20 MG Oral Tab Take 1 tablet (20 mg total) by mouth 2 (two) times daily. 60 tablet 2    FIBER OR Take by mouth.      Multiple Vitamin (MULTI-VITAMIN OR) Take 1 tablet by mouth daily.         No current facility-administered medications on file prior to visit.       ASSESSMENT  Initial Weight Data and Goal Weight Loss:  Weight Calculations  Initial Weight: 183 lbs  Initial Weight Date: 04/05/24  Today's Weight: 183 lbs  5% Goal: 9.15  10% Goal: 18.3  Total Weight Loss: 0 lbs    Diagnoses and all orders for this visit:    Encounter for therapeutic drug level monitoring    Class 1 obesity with serious comorbidity and body mass index (BMI) of 32.0 to 32.9 in adult, unspecified obesity type  -     OP REFERRAL TO DIETITIAN [facility] (WLC USE ONLY)    Mixed hyperlipidemia  -     OP REFERRAL TO DIETITIAN [facility] (WLC USE ONLY)    Prediabetes  -     OP REFERRAL TO DIETITIAN [facility] (WLC USE ONLY)    Osteoarthritis of lumbar spine, unspecified spinal  osteoarthritis complication status    Acquired hypothyroidism    Other orders  -     metFORMIN  MG Oral Tablet 24 Hr; Take 1 tablet (500 mg total) by mouth daily.        PLAN  Initial Weight: 183 lbs  Initial Weight Date: 04/05/24  Today's Weight: 183 lbs  5% Goal: 9.15  10% Goal: 18.3  Total Weight Loss: 0 lbs    Will begin metformin - discussed MOA, advised side effects and adverse effects of medication  Prediabetes - begin metformin, low carb diet  HLD - stable on current medication ezetimibe, will continue, will continue to work on lifestyle modifications  Begin logging foods - discussed macronutrient goals  -low carb high protein  -portion control  -Limit carbohydrates  -Eat breakfast every day   -Don't skip meals   -Read nutrition labels and keep a food log  -drink a lot of water 65 oz of water per day  - Do not drink your calories (no soda, juice, high calorie coffee drinks, limit alcohol)  - Do not eat late at night  Medication use and side effects reviewed with patient.  Medication contraindications: stimulant, GLP-1 caution father had thyroid cancer  Follow up with dietitian and psychologist as recommended.  Discussed the role of sleep and stress in weight management.  Labs orders as above.  Counseled on comprehensive weight loss plan including attention to nutrition, exercise and behavior/stress management for success. See patient instruction below for more details.  Weight Loss Consent to treat reviewed and signed.    Total time spent on chart review, pre-charting, obtaining history, counseling, and educating, reviewing labs was 45 minutes.      Patient Instructions   1200 calories a day  Moderate Carb (30/35/35)   82g   protein     43g   fats     96g   carbs   Lower Carb (40/40/20)   110g   protein     49g   fats     55g   carbs   We are here to support you with weight loss, but please remember that you still need your primary care provider for your routine health maintenance.      PLAN:  Home    Hello Fresh  Factor Meals  [facility]  Clean Eatz  Low Carb Kitchen  Entree Kitchen    True Lemon - water flavoring packets  Begin metformin  Follow up with me in 3 months  Schedule follow up appointments: Bradley Massey (dietitian) or Shruthi Mcfarlane (presurgery dietitian)   Check for insurance coverage for dietitian and labwork prior to scheduling appointment.     Please try to work on the following dietary changes:  Goals: Aim for 20-30 grams of protein/ meal  Eat 4-6 vegetables/day  Avoid skipping meals- eat every 4-5 hours  Aim for 3 meals/day  2. Drink lots of water and cut down on soda/juice consumption if soda/juice drinker  3. Focus on protein: (15-30 grams with each meal) ie. greek yogurt, cottage cheese, string cheese, hard boiled eggs  4. Healthy snacks: peanut butter and apples, hummus and carrots, berries, nuts (1/4 cup), tuna and crackers                 Protein Shakes: Premier protein or Core Power                Protein Bars: Rx Bars, Oatmega, Power Crunch                 Sargento balanced breaks (cheese and nuts)- without chocolate  5. Reduce carbohydrates which includes sweets as well as rice, pasta, potatoes, bread, corn and instead choose whole grain options or more protein or vegetables (4-6 servings of vegetables per day)  6. Get a good night of sleep  7. Try to decrease stress in life     Please download apps:  1. My Fitness Pal, Lose it, My Net Diary jean marie to help you to monitor daily dietary intake and you will be able to see if you are eating the right amount of calories, protein, carbs                With My Fitness Pal-->When you set-up the jean marie or need to adjust settings:                Goals should include:                 Lose 1.5-2 lbs per week                Activity level: not very active (can't count exercise towards calorie number per day)                   ** Daily INPUT> Look at nutrition section-- \"nutrients\" and it will break down your macros for the day (ie. Protein, carbs,  fibers, sugars and fats). Try to stay within these numbers daily     2. \"7 minute workout\" to help with exercise/activity which takes 7 minutes of your day and that you can do at home!   3. \"Calm\" or \"Headspace\" which helps with mindfulness, meditation, clarity, sleep, and lucy to your daily life.   4. [employer] blog for healthy recipe ideas  5. [employer] for low carb resources    HIGH PROTEIN SNACK IDEAS  -cottage cheese  -plain yogurt  -kefir  -hard-boiled eggs  -natural cheeses  -nuts (measure portion size)   -unsweetened nut butters  -dried edamame   -katie seeds soaked in water or almond milk  -soy nuts  -cured meats (monitor for sodium issues)   -hummus with vegetables  -bean dip with vegetables     FRUIT  Low carb fruit options   Raspberries: Half a cup (60 grams) contains 3 grams of carbs.  Blackberries: Half a cup (70 grams) contains 4 grams of carbs.  Strawberries: Half a cup (100 grams) contains 6 grams of carbs.  Blueberries: Half a cup (50 grams) contains 6 grams of carbs.  Plum: One medium-sized (80 grams) contains 6 grams of carbs.     VEGETABLES  Low carb vegetables            Delicious and Healthy Snacks      All snacks are under 200 calories and chocked full of nutrition!  Quick Caprese salad- Mix 1 cup grape tomatoes, 1 oz. fresh mini mozzarella balls, 1 teaspoon olive oil, ½ tsp. balsamic vinegar.  Add fresh or dried basil for flavor.  Cottage cheese and berries- Top ½ cup low fat cottage cheese with 1/2 cup of blueberries  Peanut butter and apple slices- Cut up an apple and dip in 1 Tablespoon of peanut butter  Hummus and veggies- Dip baby carrots, pepper strips or snap peas in ¼ cup of hummus  Nuts- Munch on 1 ounce of any kind of nut (about ¼ cup)  Wrap it up- Wrap 2 ounces of low sodium, nitrate free turkey around red pepper or cucumber slices  Yogurt and berries- 1/2 cup berries on a 6 ounce container of plain or low sugar fruit flavored 2% Greek yogurt (less than 15 grams sugar per  serving).  Chobani (Less Sugar)® or Siggis® are examples.  Hardboiled egg and fruit- 1 hardboiled egg and a tangerine or other small serving of fruit  Tuna and avocado- Put 2 oz tuna (one pouch) on 1/3 of an avocado or 2 Tbsp guacamole and top with salsa  String cheese and veggies- one piece cheese (3/4 ounce) and 1 cup snap peas or other vegetables  Cauliflower rice and cheese- Sprinkle 1/4 cup shredded cheese on 1 cup cauliflower rice and microwave until cheese melts  Deviled eggs- 3 deviled egg halves  Bean dip and veggies-½ cup refried beans with ¼ cup shredded cheese melted on top. Use as a dip for celery or red peppers strips or just eat plain  S[place]ento Balanced Breaks® (or make your own-1/2 ounce nuts, 1/2 ounce cheese and 1 teaspoon dried fruit)  Edamame-1 cup warmed and lightly salted  Low fat chicken, egg, or tuna salad- Mix 2 oz chicken, tuna, or 2 eggs with 1 tsp arriola,1 tsp Luc mustard and 1 tsp plain yogurt.  Add chopped celery, onions, walnuts, Granny smith apples or dried cranberries to make it interesting.  [place] break- Turkey, chicken, peanut butter or almond butter on 1 slice whole grain bread   Protein shake-Randhawa®, Core Power®, Orgain®, Premier Protein®  Protein bar-Quest ®, Oatmega®, RX Bar®      No follow-ups on file.    Patient verbalizes understanding.    Nydia Lr PA-C  4/5/2024

## 2024-04-05 NOTE — PATIENT INSTRUCTIONS
1200 calories a day  Moderate Carb (30/35/35)   82g   protein     43g   fats     96g   carbs   Lower Carb (40/40/20)   110g   protein     49g   fats     55g   carbs   We are here to support you with weight loss, but please remember that you still need your primary care provider for your routine health maintenance.      PLAN:  Home   Hello Fresh  Factor Meals  [facility]  Clean Eatz  Low Carb Kitchen  Entree Kitchen    True Lemon - water flavoring packets  Begin metformin  Follow up with me in 3 months  Schedule follow up appointments: Bradley Massey (dietitian) or Shruthi Mcfarlane (presurgery dietitian)   Check for insurance coverage for dietitian and labwork prior to scheduling appointment.     Please try to work on the following dietary changes:  Goals: Aim for 20-30 grams of protein/ meal  Eat 4-6 vegetables/day  Avoid skipping meals- eat every 4-5 hours  Aim for 3 meals/day  2. Drink lots of water and cut down on soda/juice consumption if soda/juice drinker  3. Focus on protein: (15-30 grams with each meal) ie. greek yogurt, cottage cheese, string cheese, hard boiled eggs  4. Healthy snacks: peanut butter and apples, hummus and carrots, berries, nuts (1/4 cup), tuna and crackers                 Protein Shakes: Premier protein or Core Power                Protein Bars: Rx Bars, Oatmega, Power Crunch                 Sargento balanced breaks (cheese and nuts)- without chocolate  5. Reduce carbohydrates which includes sweets as well as rice, pasta, potatoes, bread, corn and instead choose whole grain options or more protein or vegetables (4-6 servings of vegetables per day)  6. Get a good night of sleep  7. Try to decrease stress in life     Please download apps:  1. My Fitness Pal, Lose it, My Net Diary jean marie to help you to monitor daily dietary intake and you will be able to see if you are eating the right amount of calories, protein, carbs                With My Fitness Pal-->When you set-up the jean marie or need to  adjust settings:                Goals should include:                 Lose 1.5-2 lbs per week                Activity level: not very active (can't count exercise towards calorie number per day)                   ** Daily INPUT> Look at nutrition section-- \"nutrients\" and it will break down your macros for the day (ie. Protein, carbs, fibers, sugars and fats). Try to stay within these numbers daily     2. \"7 minute workout\" to help with exercise/activity which takes 7 minutes of your day and that you can do at home!   3. \"Calm\" or \"Headspace\" which helps with mindfulness, meditation, clarity, sleep, and lucy to your daily life.   4. [employer] blog for healthy recipe ideas  5. [employer] for low carb resources    HIGH PROTEIN SNACK IDEAS  -cottage cheese  -plain yogurt  -kefir  -hard-boiled eggs  -natural cheeses  -nuts (measure portion size)   -unsweetened nut butters  -dried edamame   -katie seeds soaked in water or almond milk  -soy nuts  -cured meats (monitor for sodium issues)   -hummus with vegetables  -bean dip with vegetables     FRUIT  Low carb fruit options   Raspberries: Half a cup (60 grams) contains 3 grams of carbs.  Blackberries: Half a cup (70 grams) contains 4 grams of carbs.  Strawberries: Half a cup (100 grams) contains 6 grams of carbs.  Blueberries: Half a cup (50 grams) contains 6 grams of carbs.  Plum: One medium-sized (80 grams) contains 6 grams of carbs.     VEGETABLES  Low carb vegetables            Delicious and Healthy Snacks      All snacks are under 200 calories and chocked full of nutrition!  Quick Caprese salad- Mix 1 cup grape tomatoes, 1 oz. fresh mini mozzarella balls, 1 teaspoon olive oil, ½ tsp. balsamic vinegar.  Add fresh or dried basil for flavor.  Cottage cheese and berries- Top ½ cup low fat cottage cheese with 1/2 cup of blueberries  Peanut butter and apple slices- Cut up an apple and dip in 1 Tablespoon of peanut butter  Hummus and veggies- Dip baby carrots, pepper  strips or snap peas in ¼ cup of hummus  Nuts- Munch on 1 ounce of any kind of nut (about ¼ cup)  Wrap it up- Wrap 2 ounces of low sodium, nitrate free turkey around red pepper or cucumber slices  Yogurt and berries- 1/2 cup berries on a 6 ounce container of plain or low sugar fruit flavored 2% Greek yogurt (less than 15 grams sugar per serving).  Chobani (Less Sugar)® or Siggis® are examples.  Hardboiled egg and fruit- 1 hardboiled egg and a tangerine or other small serving of fruit  Tuna and avocado- Put 2 oz tuna (one pouch) on 1/3 of an avocado or 2 Tbsp guacamole and top with salsa  String cheese and veggies- one piece cheese (3/4 ounce) and 1 cup snap peas or other vegetables  Cauliflower rice and cheese- Sprinkle 1/4 cup shredded cheese on 1 cup cauliflower rice and microwave until cheese melts  Deviled eggs- 3 deviled egg halves  Bean dip and veggies-½ cup refried beans with ¼ cup shredded cheese melted on top. Use as a dip for celery or red peppers strips or just eat plain  S[place]ento Balanced Breaks® (or make your own-1/2 ounce nuts, 1/2 ounce cheese and 1 teaspoon dried fruit)  Edamame-1 cup warmed and lightly salted  Low fat chicken, egg, or tuna salad- Mix 2 oz chicken, tuna, or 2 eggs with 1 tsp arriola,1 tsp Luc mustard and 1 tsp plain yogurt.  Add chopped celery, onions, walnuts, Granny smith apples or dried cranberries to make it interesting.  Semmes break- Turkey, chicken, peanut butter or almond butter on 1 slice whole grain bread   Protein shake-Randhawa®, Core Power®, Orgain®, Premier Protein®  Protein bar-Quest ®, Oatmega®, RX Bar®

## 2024-04-30 DIAGNOSIS — G25.0 BENIGN ESSENTIAL TREMOR: ICD-10-CM

## 2024-04-30 RX ORDER — PROPRANOLOL HYDROCHLORIDE 20 MG/1
20 TABLET ORAL 2 TIMES DAILY
Qty: 60 TABLET | Refills: 0 | Status: SHIPPED | OUTPATIENT
Start: 2024-04-30

## 2024-04-30 NOTE — TELEPHONE ENCOUNTER
Medication: Propranolol 20 mg     Date of last refill: 02/05/2024 (#60/2)  [place]     Last office visit: 06/16/2023  Due back to clinic per last office note:  6 months  Date next office visit scheduled:    Future Appointments   Date Time Provider Department Center   5/1/2024  2:00 PM Valeri Reddy RD [facility]   7/19/2024 12:40 PM Nydia Lr PA-C [facility]           Last OV note recommendation:    Patient is a 62-year-old female who presented with progressing hand tremors  Has a history of benign essential tremors     Plan: Start propanolol 20 mg twice daily  If experiencing any side effects can lower it down to 10 mg twice daily     Counseled on nonpharmacological intervention     Follow-up in about 6 months

## 2024-05-01 ENCOUNTER — OFFICE VISIT (OUTPATIENT)
Dept: INTERNAL MEDICINE CLINIC | Facility: CLINIC | Age: 64
End: 2024-05-01
Payer: COMMERCIAL

## 2024-05-01 VITALS — WEIGHT: 183 LBS | BODY MASS INDEX: 33 KG/M2

## 2024-05-01 DIAGNOSIS — R73.03 PREDIABETES: ICD-10-CM

## 2024-05-01 DIAGNOSIS — E78.2 MIXED HYPERLIPIDEMIA: ICD-10-CM

## 2024-05-01 DIAGNOSIS — E66.9 CLASS 1 OBESITY WITH SERIOUS COMORBIDITY AND BODY MASS INDEX (BMI) OF 32.0 TO 32.9 IN ADULT, UNSPECIFIED OBESITY TYPE: Primary | ICD-10-CM

## 2024-05-01 PROCEDURE — 97802 MEDICAL NUTRITION INDIV IN: CPT

## 2024-05-01 RX ORDER — PROPRANOLOL HYDROCHLORIDE 20 MG/1
20 TABLET ORAL 2 TIMES DAILY
Qty: 180 TABLET | Refills: 0 | OUTPATIENT
Start: 2024-05-01

## 2024-05-01 NOTE — PROGRESS NOTES
INITIAL OUTPATIENT NUTRITION CONSULTATION      Nutrition Assessment    Medical Diagnosis: Obesity, Prediabetes, and hypothyroidism    Physical Findings: back pain wants to lose weight to help with back pain     Client Age and Gender: 63 year old female      Labs:   No components found for: \"HGBA1C\"  Triglycerides   Date Value Ref Range Status   08/01/2023 184 (H) 30 - 149 mg/dL Final     Comment:     Reference interval for fasting triglycerides  Desirable: <150 mg/dL  Borderline: 150-199 mg/dL  High: 200-499 mg/dL  Very High: >=500 mg/dL           Triglycerides (POC)   Date Value Ref Range Status   06/14/2023 142 0 - 150 mg/dl Final     LDL Cholesterol   Date Value Ref Range Status   08/01/2023 137 (H) <100 mg/dL Final     Comment:     Desirable <100 mg/dL   Borderline 100-129 mg/dL   High     >=130mg/dL         LDL (POC)   Date Value Ref Range Status   06/14/2023 131 (A) 0 - 100 mg/dl Final     HDL Cholesterol   Date Value Ref Range Status   08/01/2023 48 40 - 59 mg/dL Final     Comment:     Interpretive Information:   An HDL cholesterol <40 mg/dL is low and constitutes a coronary heart disease risk factor. An HDL cholesterol >60 mg/dL is a negative risk factor for coronary heart disease.         HDL (POC)   Date Value Ref Range Status   06/14/2023 49 40 - 55 mg/dl Final     AST   Date Value Ref Range Status   08/01/2023 23 15 - 37 U/L Final   06/11/2013 23 15 - 41 U/L Final     ALT   Date Value Ref Range Status   08/01/2023 24 13 - 56 U/L Final   06/11/2013 33 14 - 54 U/L Final         Height:  Ht Readings from Last 1 Encounters:   04/05/24 5' 2.5\" (1.588 m)       Weight:   Wt Readings from Last 2 Encounters:   04/05/24 183 lb   10/06/23 186 lb 9.6 oz       BMI Readings from Last 1 Encounters:   04/05/24 32.94 kg/m²       Weight change: No change      Diet/Weight History: chance avalos    Current Diet: trying to eat protein first, really enjoys the carbs more     Food/Beverage Intake:    Breakfast: protein bar (atkins)   Lunch: protein bar if at work   Dinner: over eats at dinner   Sweet potato, veg, chicken   Healthy choice dinner   Snacks: ice cream on occasion  Beverages: not drinking enough water    Meal pattern: 2-3 meals/d, 1 snacks/d    Number of meals/week eaten at restaurants: a few         Alcohol Intake: 1 glass of wine a night    Estimated current caloric intake: ?    Estimated caloric needs for weight loss: 1200 cals/d for 1-2 pounds/week weight loss    Physical Activity: jazzercise 3x/week, rides bike with daughter, walks dog 30 min after dinner every day     Food Journal: n/a    Spent 40 minutes in consultation with the patient.    Nutrition Intervention/Education:  Comprehensive nutrition education and evaluation provided for weight loss. Reviewed diet and weight history above.  Discussed eating habits for sustainable weight loss such as adequate protein with every meal, protein+produce with meals and snacks, and mindful eating techniques. Assisted pt in making goals below.    Goals:   20 g protein with meals  Don't skip lunch  Follow myplate plan  Increase water     Monitoring/Evaluation:  Patient encouraged to schedule follow up appt          Valeri Reddy RD, LDN

## 2024-07-02 NOTE — TELEPHONE ENCOUNTER
Ly Pelaez requesting Medication Refill for:    Medication name and dose (copy and paste from medication list):   DULoxetine 60 MG Oral Cap DR Particles 90 capsule 3 2/6/2024 --   Sig:   Take 1 capsule (60 mg total) by mouth daily.     Route:   Oral         If medication is not on medication list - transfer patient to RN queue for triage    Preferred Pharmacy: [facility]    LOV: 10/6/2023   Last Refill date: 2/6/24  Next Scheduled appointment: No future appointments.

## 2024-07-03 DIAGNOSIS — E03.9 ACQUIRED HYPOTHYROIDISM: ICD-10-CM

## 2024-07-03 NOTE — TELEPHONE ENCOUNTER
Ly Pelaez requesting Medication Refill for:    Medication name and dose (copy and paste from medication list):   LEVOTHYROXINE 75 MCG Oral Tab 90 tablet 0 3/21/2024 --   Sig:   TAKE 1 TABLET(75 MCG) BY MOUTH BEFORE BREAKFAST     Route:   Oral         If medication is not on medication list - transfer patient to RN queue for triage    Preferred Pharmacy:   [id number] - [place] [address] AT [facility] [address], [phone], [phone]   [facility] [address] [facility] [address]   Phone: [phone] Fax: [phone]   Hours: Not open 24 hours       LOV: 10/6/2023   Last Refill date: 3/21/24  Next Scheduled appointment: No future appointments.

## 2024-07-03 NOTE — TELEPHONE ENCOUNTER
Patient called to check on the status stated she is completely out asking if its ok to go without taking them?

## 2024-07-05 RX ORDER — LEVOTHYROXINE SODIUM 0.07 MG/1
75 TABLET ORAL
Qty: 90 TABLET | Refills: 0 | Status: SHIPPED | OUTPATIENT
Start: 2024-07-05

## 2024-07-05 RX ORDER — DULOXETIN HYDROCHLORIDE 60 MG/1
60 CAPSULE, DELAYED RELEASE ORAL DAILY
Qty: 90 CAPSULE | Refills: 3 | Status: SHIPPED | OUTPATIENT
Start: 2024-07-05

## 2024-07-05 NOTE — TELEPHONE ENCOUNTER
Refill passed per [facility] protocol.  Requested Prescriptions   Pending Prescriptions Disp Refills    levothyroxine 75 MCG Oral Tab 90 tablet 0     Sig: Take 1 tablet (75 mcg total) by mouth before breakfast.       Thyroid Medication Protocol Passed - 7/3/2024  1:57 PM        Passed - TSH in past 12 months        Passed - Last TSH value is normal     Lab Results   Component Value Date    TSH 1.770 08/01/2023                 Passed - In person appointment or virtual visit in the past 12 mos or appointment in next 3 mos     Recent Outpatient Visits              2 months ago Class 1 obesity with serious comorbidity and body mass index (BMI) of 32.0 to 32.9 in adult, unspecified obesity type    [facility], [address] [place] [address] Valeri Tucker RD    Office Visit    3 months ago Encounter for therapeutic drug level monitoring    [address]-C    Office Visit    9 months ago Routine general medical examination at a health care facility    [facility], [address] [place], [place] [address]Silvia Amish, MD    Office Visit    1 year ago Benign essential tremor    [facility], [facility] Esvin Sarabia MD    Office Visit    1 year ago Acquired hypothyroidism    [address]    Office Visit                           Recent Outpatient Visits              2 months ago Class 1 obesity with serious comorbidity and body mass index (BMI) of 32.0 to 32.9 in adult, unspecified obesity type    [facility], [address] [place] [address] Valeri Tucker RD    Office Visit    3 months ago Encounter for therapeutic drug level monitoring    [address]    Office Visit    9 months ago Routine general medical examination at a health care facility    [facility], [address] [place] [address], Servando Deshpande MD    Office Visit    1 year ago Benign essential tremor    [facility]    Office Visit    1 year ago Acquired hypothyroidism    [facility], [address] [place] [address], Servando Deshpande MD    Office Visit

## 2024-07-08 NOTE — TELEPHONE ENCOUNTER
Requesting   Requested Prescriptions     Pending Prescriptions Disp Refills    METFORMIN  MG Oral Tablet 24 Hr [Pharmacy Med Name: METFORMIN ER 500MG 24HR TABS] 90 tablet 0     Sig: TAKE 1 TABLET(500 MG) BY MOUTH DAILY      LOV: 04/05/24  RTC:   Last Relevant Labs:   Filled: 04/05/24 #90 with 0 refills    Future Appointments   Date Time Provider Department Center   8/20/2024  2:40 PM Nydia Lr PA-C [facility] [address]

## 2024-07-09 RX ORDER — METFORMIN HYDROCHLORIDE 500 MG/1
500 TABLET, EXTENDED RELEASE ORAL DAILY
Qty: 90 TABLET | Refills: 0 | Status: SHIPPED | OUTPATIENT
Start: 2024-07-09

## 2024-07-09 NOTE — TELEPHONE ENCOUNTER
Ly Pelaez requesting Medication Refill for:    Medication name and dose (copy and paste from medication list): Levothyroxine 0.025mg (25 MCG)    If medication is not on medication list - transfer patient to RN queue for triage    [facility]    LOV: 10/6/2023   Last Refill date: 7/5/24 filled 75 MCG  Next Scheduled appointment:   Future Appointments   Date Time Provider Department Center   8/20/2024  2:40 PM Nydia Lr PA-C [facility] [address]

## 2024-07-12 RX ORDER — LEVOTHYROXINE SODIUM 0.03 MG/1
25 TABLET ORAL DAILY
Qty: 90 TABLET | Refills: 1 | OUTPATIENT
Start: 2024-07-12

## 2024-07-16 ENCOUNTER — LAB ENCOUNTER (OUTPATIENT)
Dept: LAB | Age: 64
End: 2024-07-16
Attending: INTERNAL MEDICINE
Payer: COMMERCIAL

## 2024-07-16 DIAGNOSIS — E78.2 MIXED HYPERLIPIDEMIA: ICD-10-CM

## 2024-07-16 DIAGNOSIS — R73.03 PREDIABETES: ICD-10-CM

## 2024-07-16 DIAGNOSIS — E03.9 ACQUIRED HYPOTHYROIDISM: ICD-10-CM

## 2024-07-16 DIAGNOSIS — D75.89 MACROCYTOSIS WITHOUT ANEMIA: ICD-10-CM

## 2024-07-16 LAB
BASOPHILS # BLD AUTO: 0.04 X10(3) UL (ref 0–0.2)
BASOPHILS NFR BLD AUTO: 0.9 %
EOSINOPHIL # BLD AUTO: 0.19 X10(3) UL (ref 0–0.7)
EOSINOPHIL NFR BLD AUTO: 4.2 %
ERYTHROCYTE [DISTWIDTH] IN BLOOD BY AUTOMATED COUNT: 11.9 %
EST. AVERAGE GLUCOSE BLD GHB EST-MCNC: 117 MG/DL (ref 68–126)
HBA1C MFR BLD: 5.7 % (ref ?–5.7)
HCT VFR BLD AUTO: 39.5 %
HGB BLD-MCNC: 13.1 G/DL
IMM GRANULOCYTES # BLD AUTO: 0.01 X10(3) UL (ref 0–1)
IMM GRANULOCYTES NFR BLD: 0.2 %
LYMPHOCYTES # BLD AUTO: 1.88 X10(3) UL (ref 1–4)
LYMPHOCYTES NFR BLD AUTO: 42 %
MCH RBC QN AUTO: 35.6 PG (ref 26–34)
MCHC RBC AUTO-ENTMCNC: 33.2 G/DL (ref 31–37)
MCV RBC AUTO: 107.3 FL
MONOCYTES # BLD AUTO: 0.69 X10(3) UL (ref 0.1–1)
MONOCYTES NFR BLD AUTO: 15.4 %
NEUTROPHILS # BLD AUTO: 1.67 X10 (3) UL (ref 1.5–7.7)
NEUTROPHILS # BLD AUTO: 1.67 X10(3) UL (ref 1.5–7.7)
NEUTROPHILS NFR BLD AUTO: 37.3 %
PLATELET # BLD AUTO: 300 10(3)UL (ref 150–450)
RBC # BLD AUTO: 3.68 X10(6)UL
WBC # BLD AUTO: 4.5 X10(3) UL (ref 4–11)

## 2024-07-16 PROCEDURE — 80061 LIPID PANEL: CPT | Performed by: INTERNAL MEDICINE

## 2024-07-16 PROCEDURE — 80050 GENERAL HEALTH PANEL: CPT | Performed by: INTERNAL MEDICINE

## 2024-07-16 PROCEDURE — 83036 HEMOGLOBIN GLYCOSYLATED A1C: CPT | Performed by: INTERNAL MEDICINE

## 2024-07-17 ENCOUNTER — TELEPHONE (OUTPATIENT)
Dept: FAMILY MEDICINE CLINIC | Facility: CLINIC | Age: 64
End: 2024-07-17

## 2024-07-17 ENCOUNTER — TELEPHONE (OUTPATIENT)
Dept: INTERNAL MEDICINE CLINIC | Facility: CLINIC | Age: 64
End: 2024-07-17

## 2024-07-17 DIAGNOSIS — Z12.31 ENCOUNTER FOR SCREENING MAMMOGRAM FOR MALIGNANT NEOPLASM OF BREAST: Primary | ICD-10-CM

## 2024-07-17 LAB
ALBUMIN SERPL-MCNC: 4.5 G/DL (ref 3.2–4.8)
ALBUMIN/GLOB SERPL: 1.1 {RATIO} (ref 1–2)
ALP LIVER SERPL-CCNC: 95 U/L
ALT SERPL-CCNC: 13 U/L
ANION GAP SERPL CALC-SCNC: 8 MMOL/L (ref 0–18)
AST SERPL-CCNC: 20 U/L (ref ?–34)
BILIRUB SERPL-MCNC: 0.4 MG/DL (ref 0.2–1.1)
BUN BLD-MCNC: 9 MG/DL (ref 9–23)
CALCIUM BLD-MCNC: 9.6 MG/DL (ref 8.7–10.4)
CHLORIDE SERPL-SCNC: 101 MMOL/L (ref 98–112)
CO2 SERPL-SCNC: 27 MMOL/L (ref 21–32)
CREAT BLD-MCNC: 0.76 MG/DL
EGFRCR SERPLBLD CKD-EPI 2021: 87 ML/MIN/1.73M2 (ref 60–?)
FASTING STATUS PATIENT QL REPORTED: YES
GLOBULIN PLAS-MCNC: 4.2 G/DL (ref 2.8–4.4)
GLUCOSE BLD-MCNC: 88 MG/DL (ref 70–99)
OSMOLALITY SERPL CALC.SUM OF ELEC: 280 MOSM/KG (ref 275–295)
POTASSIUM SERPL-SCNC: 3.8 MMOL/L (ref 3.5–5.1)
PROT SERPL-MCNC: 8.7 G/DL (ref 5.7–8.2)
SODIUM SERPL-SCNC: 136 MMOL/L (ref 136–145)
TSI SER-ACNC: 4.71 MIU/ML (ref 0.55–4.78)

## 2024-07-17 NOTE — TELEPHONE ENCOUNTER
Pt called. She states she had labs yesterday and is calling about results. Informed pt AD has not reviewed the results yet. Once he reviews results and provides recommendations we will contact her via phone call or [employer] to notify her of results. She verbalized understanding.

## 2024-07-18 DIAGNOSIS — E78.00 PURE HYPERCHOLESTEROLEMIA: ICD-10-CM

## 2024-07-18 DIAGNOSIS — D75.89 MACROCYTOSIS: ICD-10-CM

## 2024-07-18 DIAGNOSIS — E03.9 ACQUIRED HYPOTHYROIDISM: ICD-10-CM

## 2024-07-18 DIAGNOSIS — R73.03 PREDIABETES: Primary | ICD-10-CM

## 2024-07-18 LAB
CHOLEST SERPL-MCNC: 276 MG/DL (ref ?–200)
FASTING PATIENT LIPID ANSWER: YES
HDLC SERPL-MCNC: 46 MG/DL (ref 40–59)
LDLC SERPL CALC-MCNC: 207 MG/DL (ref ?–100)
NONHDLC SERPL-MCNC: 230 MG/DL (ref ?–130)
TRIGL SERPL-MCNC: 127 MG/DL (ref 30–149)
VLDLC SERPL CALC-MCNC: 27 MG/DL (ref 0–30)

## 2024-07-19 ENCOUNTER — TELEPHONE (OUTPATIENT)
Dept: INTERNAL MEDICINE CLINIC | Facility: CLINIC | Age: 64
End: 2024-07-19

## 2024-07-19 NOTE — TELEPHONE ENCOUNTER
Attempted to call patient regarding lab results and new orders. My chart message sent as well.

## 2024-07-20 NOTE — TELEPHONE ENCOUNTER
90 day supply of Propranolol denied as patient is over due for a follow up appointment  
normal for race

## 2024-09-26 DIAGNOSIS — E03.9 ACQUIRED HYPOTHYROIDISM: ICD-10-CM

## 2024-09-27 NOTE — TELEPHONE ENCOUNTER
Requested Prescriptions     Pending Prescriptions Disp Refills    METFORMIN  MG Oral Tablet 24 Hr [Pharmacy Med Name: METFORMIN ER 500MG 24HR TABS] 90 tablet 0     Sig: TAKE 1 TABLET(500 MG) BY MOUTH DAILY      LOV: 04/05/24  RTC: 3-6month  Last Relevant Labs:   Filled: 07/09/04 #90 with 0 refills    No future appointments.   
Color consistent with ethnicity/race, warm, dry intact, resilient.

## 2024-09-29 RX ORDER — LEVOTHYROXINE SODIUM 75 UG/1
75 TABLET ORAL
Qty: 30 TABLET | Refills: 0 | Status: SHIPPED | OUTPATIENT
Start: 2024-09-29

## 2024-09-29 NOTE — TELEPHONE ENCOUNTER
LEVOTHYROXINE 0.075MG (75MCG) TABS          Will file in chart as: LEVOTHYROXINE 75 MCG Oral Tab    Sig: TAKE 1 TABLET(75 MCG) BY MOUTH BEFORE BREAKFAST    Disp: 90 tablet    Refills: 0 (Pharmacy requested: Not specified)    Start: 9/26/2024    Class: Normal    Non-formulary For: Acquired hypothyroidism    Last ordered: 2 months ago (7/5/2024) by Servando Alicea MD    Last refill: 7/5/2024    Rx #: [id number]    Thyroid Medication Protocol [id number]/26/2024 08:00 PM   Protocol Details TSH in past 12 months    Last TSH value is normal    In person appointment or virtual visit in the past 12 mos or appointment in next 3 mos      LOV 10/6/23  Filled 7/5/24 90 tabs 0 refills   No future appointments.  Last TSH 7/16/24

## 2024-09-30 RX ORDER — METFORMIN HCL 500 MG
500 TABLET, EXTENDED RELEASE 24 HR ORAL DAILY
Qty: 90 TABLET | Refills: 0 | Status: SHIPPED | OUTPATIENT
Start: 2024-09-30

## 2024-10-17 DIAGNOSIS — G25.0 BENIGN ESSENTIAL TREMOR: ICD-10-CM

## 2024-10-17 NOTE — TELEPHONE ENCOUNTER
Patient scheduled appointment    Future Appointments   Date Time Provider Department Center   11/15/2024  9:30 AM Esvin Jaeger MD [facility]

## 2024-10-17 NOTE — TELEPHONE ENCOUNTER
[employer] message sent to schedule appointment    Medication: Propranolol 20mg     Date of last refill: 4/30/24 (#60/0)  [place]      Last office visit: 6/16/23  Due back to clinic per last office note:  6m  Date next office visit scheduled:    No future appointments.        Last OV note recommendation:    (G25.0) Benign essential tremor  (primary encounter diagnosis)     Patient is a 62-year-old female who presented with progressing hand tremors  Has a history of benign essential tremors     Plan: Start propanolol 20 mg twice daily  If experiencing any side effects can lower it down to 10 mg twice daily     Counseled on nonpharmacological intervention     Follow-up in about 6 months

## 2024-10-21 RX ORDER — PROPRANOLOL HCL 20 MG
20 TABLET ORAL 2 TIMES DAILY
Qty: 60 TABLET | Refills: 0 | Status: SHIPPED | OUTPATIENT
Start: 2024-10-21

## 2024-11-14 DIAGNOSIS — E03.9 ACQUIRED HYPOTHYROIDISM: ICD-10-CM

## 2024-11-14 NOTE — TELEPHONE ENCOUNTER
Ly Pelaez requesting Medication Refill for:    Medication name and dose (copy and paste from medication list):   Medication Quantity Refills Start End   levothyroxine 75 MCG Oral Tab 30 tablet 0 9/29/2024 --   Sig:   TAKE 1 TABLET(75 MCG) BY MOUTH BEFORE BREAKFAST     Route:   Oral     Order #:   [id number]         If medication is not on medication list - transfer patient to RN queue for triage    Preferred Pharmacy:   [facility] #[id number] [place] [address] AT [address] [place] [address], [phone], [phone]   [facility] [facility] [address]   Phone: [phone] Fax: [phone]   Hours: Not open 24 hours       LOV: Visit date not found   Last Refill date:   Next Scheduled appointment:   Future Appointments   Date Time Provider Department Center   11/15/2024  9:30 AM Esvin Jaeger MD [facility] [place]

## 2024-11-15 ENCOUNTER — OFFICE VISIT (OUTPATIENT)
Dept: NEUROLOGY | Facility: CLINIC | Age: 64
End: 2024-11-15
Payer: MEDICARE

## 2024-11-15 VITALS
SYSTOLIC BLOOD PRESSURE: 118 MMHG | DIASTOLIC BLOOD PRESSURE: 78 MMHG | WEIGHT: 184 LBS | BODY MASS INDEX: 33 KG/M2 | HEART RATE: 82 BPM | RESPIRATION RATE: 16 BRPM

## 2024-11-15 DIAGNOSIS — G20.C PARKINSONISM, UNSPECIFIED PARKINSONISM TYPE (HCC): ICD-10-CM

## 2024-11-15 DIAGNOSIS — R25.8 BRADYKINESIA: ICD-10-CM

## 2024-11-15 DIAGNOSIS — G25.0 BENIGN ESSENTIAL TREMOR: ICD-10-CM

## 2024-11-15 PROCEDURE — 99214 OFFICE O/P EST MOD 30 MIN: CPT | Performed by: OTHER

## 2024-11-15 RX ORDER — PROPRANOLOL HYDROCHLORIDE 60 MG/1
1 CAPSULE, EXTENDED RELEASE ORAL EVERY EVENING
Qty: 30 CAPSULE | Refills: 2 | Status: SHIPPED | OUTPATIENT
Start: 2024-11-15

## 2024-11-15 NOTE — PATIENT INSTRUCTIONS
Refill policies:    Allow 2-3 business days for refills; controlled substances may take longer.  Contact your pharmacy at least 5 days prior to running out of medication and have them send an electronic request or submit request through the “request refill” option in your [employer] account.  Refills are not addressed on weekends; covering physicians do not authorize routine medications on weekends.  No narcotics or controlled substances are refilled after noon on Fridays or by on call physicians.  By law, narcotics must be electronically prescribed.  A 30 day supply with no refills is the maximum allowed.  If your prescription is due for a refill, you may be due for a follow up appointment.  To best provide you care, patients receiving routine medications need to be seen at least once a year.  Patients receiving narcotic/controlled substance medications need to be seen at least once every 3 months.  In the event that your preferred pharmacy does not have the requested medication in stock (e.g. Backordered), it is your responsibility to find another pharmacy that has the requested medication available.  We will gladly send a new prescription to that pharmacy at your request.    Scheduling Tests:    If your physician has ordered radiology tests such as MRI or CT scans, please contact Central Scheduling at [phone] right away to schedule the test.  Once scheduled, the [facility] Centralized Referral Team will work with your insurance carrier to obtain pre-certification or prior authorization.  Depending on your insurance carrier, approval may take 3-10 days.  It is highly recommended patients assure they have received an authorization before having a test performed.  If test is done without insurance authorization, patient may be responsible for the entire amount billed.      Precertification and Prior Authorizations:  If your physician has recommended that you have a procedure or additional testing performed the [facility]  Centralized Referral Team will contact your insurance carrier to obtain pre-certification or prior authorization.    You are strongly encouraged to contact your insurance carrier to verify that your procedure/test has been approved and is a COVERED benefit.  Although the [facility] Centralized Referral Team does its due diligence, the insurance carrier gives the disclaimer that \"Although the procedure is authorized, this does not guarantee payment.\"    Ultimately the patient is responsible for payment.   Thank you for your understanding in this matter.  Paperwork Completion:  If you require FMLA or disability paperwork for your recovery, please make sure to either drop it off or have it faxed to our office at [phone]. Be sure the form has your name and date of birth on it.  The form will be faxed to our Forms Department and they will complete it for you.  There is a 25$ fee for all forms that need to be filled out.  Please be aware there is a 10-14 day turnaround time.  You will need to sign a release of information (KITTY) form if your paperwork does not come with one.  You may call the Forms Department with any questions at [phone].  Their fax number is [phone].

## 2024-11-15 NOTE — PROGRESS NOTES
HPI:    Patient ID: Ly Pelaez is a 64 year old female.    HPI      Ly Pelaez is a 64-year-old right-handed female who presented for follow-up for probable benign essential tremors.  Patient was seen last in 2023 for hand tremors and then she missed follow-up.  She states that tremors have increased, getting both resting and action related tremors.  She is on propranolol 20 mg twice daily which is somewhat helpful.  Patient also has anxiety which worsens the tremors.  She still denies any gait balance problem but endorses some fine motor difficulty and handgrip is getting weaker.  Patient reports her father had Shy-Drager syndrome.       Patient is a 62-year-old right-handed female who presented for evaluation of hand tremors.  She had seen me about 3 years ago for similar problem and at that time we recommended observation.  She states over the past year tremors has increased and interfering with her activities. States family and friends are noticing and having difficulty writing and with fine motor movements.  She denies any balance/gait problem, rigidity, bradykinesia. She denies any family history of essential tremors but her mother in her 90's has fine hand tremors. She is on Cymbalta 20 mg at night. No prior use of antipsychotics or multiple neuropsychotropic medications. She drinks alcohol socially.    HISTORY:  Past Medical History:    Esophageal reflux    pt gets stomach pain att night takes tums    High cholesterol      Past Surgical History:   Procedure Laterality Date          Other surgical history      laparoscopy for infertility    Removal of ovarian cyst(s)      , laparascopy      Family History   Problem Relation Age of Onset    Arthritis Mother     Depression Mother     Other (shy drager syndrome) Father     Other (alzheimer's) Maternal Grandmother     Other (brain cancer) Paternal Grandfather     Breast Cancer Paternal Grandmother     Depression Sister     Heart  Disease Maternal Grandfather       Social History     Socioeconomic History    Marital status:    Tobacco Use    Smoking status: Never    Smokeless tobacco: Never   Vaping Use    Vaping status: Never Used   Substance and Sexual Activity    Alcohol use: Yes     Alcohol/week: 7.0 standard drinks of alcohol     Types: 7 Glasses of wine per week     Comment: CAGE 12/20/19    Drug use: No    Sexual activity: Yes     Partners: Male   Other Topics Concern    Caffeine Concern Yes     Comment: cup a day    Exercise Yes     Comment: 3 times a week      Social Drivers of Health     Physical Activity: Sufficiently Active (11/4/2020)    Received from [employer], [employer]    Exercise Vital Sign     Days of Exercise per Week: 3 days     Minutes of Exercise per Session: 60 min        Review of Systems   Constitutional: Negative.    HENT: Negative.     Eyes: Negative.    Respiratory: Negative.     Cardiovascular: Negative.    Gastrointestinal: Negative.    Musculoskeletal:  Positive for gait problem.   Neurological:  Positive for tremors.   All other systems reviewed and are negative.           Current Outpatient Medications   Medication Sig Dispense Refill    Propranolol HCl ER 60 MG Oral Capsule SR 24 Hr Take 1 capsule (60 mg total) by mouth every evening. 30 capsule 2    METFORMIN  MG Oral Tablet 24 Hr TAKE 1 TABLET(500 MG) BY MOUTH DAILY 90 tablet 0    levothyroxine 75 MCG Oral Tab TAKE 1 TABLET(75 MCG) BY MOUTH BEFORE BREAKFAST 30 tablet 0    DULoxetine 60 MG Oral Cap DR Particles Take 1 capsule (60 mg total) by mouth daily. 90 capsule 3    ezetimibe 10 MG Oral Tab Take 1 tablet (10 mg total) by mouth daily.      FIBER OR Take by mouth.      Multiple Vitamin (MULTI-VITAMIN OR) Take 1 tablet by mouth daily.         Allergies:No Known Allergies  PHYSICAL EXAM:   Physical Exam  Blood pressure 118/78, pulse 82, resp. rate 16, weight 184 lb (83.5 kg), not currently breastfeeding.      General  Appearance: Well nourished, well developed, no apparent distress.   HEENT: Normocephalic and atraumatic.  Cardiovascular: Normal rate, regular rhythm and normal heart sounds.    Pulmonary/Chest: Effort normal and breath sounds normal.   Abdominal: Soft. Bowel sounds are normal.       Mental Status Exam: Patient is awake, alert and oriented to person, place and time   Normal memory, fund of knowledge, attention/concentration and language.    Cranial Nerves:   II: Visual acuity: normal    III: Pupils: equal, round, reactive to light  III,IV,VI: Extra Ocular Movements: intact  V: Facial sensation: intact  VII: Facial strength: intact  VIII: Hearing: intact  IX: Palate: intact  XI: Shoulder shrug: intact  XII: Tongue movement: normal    Motor Exam: + Cogwheel rigidity right wrist.  + bradykinesia and mild hypomimia  right hand >left hand both resting and postural hand tremors    Sensory: Sensory examination is normal to light touch and pinprick     Coordination: Finger-to-nose  normal bilaterally without evidence of dysmetria.    Gait: normal casual gait         ASSESSMENT/PLAN:       ICD-10-CM    1. Parkinsonism, unspecified Parkinsonism type (HCC)  G20.C NM BRAIN TIO IOFLUPANE (SINGLE) 1 DAY (CPT=78803)      2. Benign essential tremor  G25.0 NM BRAIN TIO IOFLUPANE (SINGLE) 1 DAY (CPT=78803)      3. Bradykinesia  R25.8             Patient is a 64-year-old female with history of benign essential tremor presented with worsening hand tremors.  On examination there are signs of Parkinsonism    We recommend NM TIO scan for further delineation and to rule out parkinsonian syndrome  Patient has a positive family history for multiple system atrophy/Shy Drager     Switch regular propranolol to propranolol extended release 60 mg every evening  If no improvement will consider starting carbidopa levodopa    Counseled on nonpharmacological intervention    Follow-up in about 3 months        Esvin Jaeger MD  [facility]  [place]    This note was prepared using [employer] voice recognition dictation software. As a result errors may occur. When identified these errors have been corrected. While every attempt is made to correct errors during dictation discrepancies may still exist       Meds This Visit:  Requested Prescriptions     Signed Prescriptions Disp Refills    Propranolol HCl ER 60 MG Oral Capsule SR 24 Hr 30 capsule 2     Sig: Take 1 capsule (60 mg total) by mouth every evening.       Imaging & Referrals:  NM BRAIN TIO IOFLUPANE (SINGLE) 1 DAY (CPT=78803)     ID#[id number]

## 2024-11-16 DIAGNOSIS — G25.0 BENIGN ESSENTIAL TREMOR: ICD-10-CM

## 2024-11-18 RX ORDER — PROPRANOLOL HCL 20 MG
20 TABLET ORAL 2 TIMES DAILY
Qty: 60 TABLET | Refills: 0 | OUTPATIENT
Start: 2024-11-18

## 2024-11-18 NOTE — TELEPHONE ENCOUNTER
Refill request denied, discontinued-       Switch regular propranolol to propranolol extended release 60 mg every evening

## 2024-11-19 RX ORDER — LEVOTHYROXINE SODIUM 75 UG/1
75 TABLET ORAL
Qty: 30 TABLET | Refills: 0 | Status: SHIPPED | OUTPATIENT
Start: 2024-11-19

## 2024-11-19 NOTE — TELEPHONE ENCOUNTER
Please review; protocol failed    Message sent to patient about lab work needing to be done from 7/18/2024. Advised patient once she schedules her appointment, to complete her lab orders no later that 1 week prior to scheduled appointment.    MyChart message sent to patient to schedule an office visit with PCP.   Routed to 35  staff to call patient and make an appointment.    No future appointments with family medicine/internal medicine.  Last office visit: 10/23/2023    Requested Prescriptions   Pending Prescriptions Disp Refills    levothyroxine 75 MCG Oral Tab 30 tablet 0     Sig: Take 1 tablet (75 mcg total) by mouth before breakfast.       Thyroid Medication Protocol Failed - 11/19/2024  4:20 PM        Failed - In person appointment or virtual visit in the past 12 mos or appointment in next 3 mos     Recent Outpatient Visits              4 days ago Parkinsonism, unspecified Parkinsonism type (HCC)    [facility] Esvin Jaeger MD    Office Visit    6 months ago Class 1 obesity with serious comorbidity and body mass index (BMI) of 32.0 to 32.9 in adult, unspecified obesity type    [address] Valeri Reddy RD    Office Visit    7 months ago Encounter for therapeutic drug level monitoring    [address] Nydia Lr PA-C    Office Visit    1 year ago Routine general medical examination at a health care facility    [address] Servando Alicea MD    Office Visit    1 year ago Benign essential tremor    [facility] Esvin Jaeger MD    Office Visit          Future Appointments         Provider Department Appt Notes    In 2 months EH NUC DOSE [facility] Nuclear Medicine     In 2 months EH NUC [address] Nuclear Medicine     In 3 months Esvin Jaeger MD  [facility] 3 mon f/u Parkinsonism, unspecified Parkinsonism type (HCC) G20.C, Benign essential tremor G25.0, Bradykinesia R25.8                    Passed - TSH in past 12 months        Passed - Last TSH value is normal     Lab Results   Component Value Date    TSH 4.713 07/16/2024                      Future Appointments         Provider Department Appt Notes    In 2 months EH NUC DOSE [facility] Nuclear Medicine     In 2 months EH NUC [address] Nuclear Medicine     In 3 months Esvin Jaeger MD [facility] 3 mon f/u Parkinsonism, unspecified Parkinsonism type (HCC) G20.C, Benign essential tremor G25.0, Bradykinesia R25.8          Recent Outpatient Visits              4 days ago Parkinsonism, unspecified Parkinsonism type (HCC)    [facility] Esvin Jaeger MD    Office Visit    6 months ago Class 1 obesity with serious comorbidity and body mass index (BMI) of 32.0 to 32.9 in adult, unspecified obesity type    [facility], [address] [place] [address] Valeri eRddy RD    Office Visit    7 months ago Encounter for therapeutic drug level monitoring    [facility], [address] [place] [address] Nydia Lr PA-C    Office Visit    1 year ago Routine general medical examination at a health care facility    [facility], [address] [place] [address] Servando Alicea MD    Office Visit    1 year ago Benign essential tremor    [facility] Esvin Jaeger MD    Office Visit

## 2024-11-27 NOTE — TELEPHONE ENCOUNTER
Requesting   Requested Prescriptions     Pending Prescriptions Disp Refills    metFORMIN  MG Oral Tablet 24 Hr 90 tablet 0     Sig: Take 1 tablet (500 mg total) by mouth daily.      LOV: 04/24  RTC: 3-6months  Last Relevant Labs:   Filled: 09/30/24 #90 with 0 refills    Future Appointments   Date Time Provider Department Center   1/21/2025  8:30 AM  NUC DOSE RM  [facility]   1/21/2025  2:00 PM  NUC RM3  [facility]   2/17/2025 10:10 AM Esvin Jaeger MD [facility]

## 2024-11-29 RX ORDER — METFORMIN HYDROCHLORIDE 500 MG/1
500 TABLET, EXTENDED RELEASE ORAL DAILY
Qty: 90 TABLET | Refills: 0 | Status: SHIPPED | OUTPATIENT
Start: 2024-11-29

## 2024-12-16 ENCOUNTER — OFFICE VISIT (OUTPATIENT)
Facility: CLINIC | Age: 64
End: 2024-12-16
Payer: MEDICARE

## 2024-12-16 VITALS
SYSTOLIC BLOOD PRESSURE: 112 MMHG | BODY MASS INDEX: 32.83 KG/M2 | DIASTOLIC BLOOD PRESSURE: 70 MMHG | RESPIRATION RATE: 16 BRPM | WEIGHT: 183 LBS | HEIGHT: 62.5 IN

## 2024-12-16 DIAGNOSIS — E03.9 ACQUIRED HYPOTHYROIDISM: ICD-10-CM

## 2024-12-16 DIAGNOSIS — R73.03 PREDIABETES: ICD-10-CM

## 2024-12-16 DIAGNOSIS — E66.811 CLASS 1 OBESITY WITH SERIOUS COMORBIDITY AND BODY MASS INDEX (BMI) OF 32.0 TO 32.9 IN ADULT, UNSPECIFIED OBESITY TYPE: ICD-10-CM

## 2024-12-16 DIAGNOSIS — Z51.81 ENCOUNTER FOR THERAPEUTIC DRUG LEVEL MONITORING: Primary | ICD-10-CM

## 2024-12-16 DIAGNOSIS — E78.2 MIXED HYPERLIPIDEMIA: ICD-10-CM

## 2024-12-16 DIAGNOSIS — G25.0 BENIGN ESSENTIAL TREMOR: ICD-10-CM

## 2024-12-16 PROCEDURE — 99214 OFFICE O/P EST MOD 30 MIN: CPT | Performed by: PHYSICIAN ASSISTANT

## 2024-12-16 RX ORDER — METFORMIN HYDROCHLORIDE 500 MG/1
1000 TABLET, EXTENDED RELEASE ORAL DAILY
Qty: 180 TABLET | Refills: 0 | Status: SHIPPED | OUTPATIENT
Start: 2024-12-16

## 2024-12-16 NOTE — PROGRESS NOTES
HISTORY OF PRESENT ILLNESS  Chief Complaint   Patient presents with    Weight Check       Ly Pelaez is a 64 year old female here for follow up in medical weight loss program.   Last OV 4/5/24  No change  Compliant on metformin  Denies chest pain, shortness of breath, dizziness, blurred vision, headache, paresthesia, nausea/vomiting.   Met with Valeri dietician once  Feels like she gains a pound and loses a pound    Exercise/Activity: jazzercise 3 days a week, walking the dog  Nutrition: 24 hour food log reviewed, eating regular meals, +protein  Stress: manageable, a little higher because neurology wants to evaluate for parkinsons  Sleep: no problems    [facility] Follow Up    General Information  Nutrition Recall  Exercise     Sleep              Wt Readings from Last 6 Encounters:   12/16/24 183 lb (83 kg)   11/15/24 184 lb (83.5 kg)   05/01/24 183 lb (83 kg)   04/05/24 183 lb (83 kg)   10/06/23 186 lb 9.6 oz (84.6 kg)   06/16/23 188 lb (85.3 kg)            Breakfast Lunch Dinner Snacks Fluids   Reviewed           REVIEW OF SYSTEMS  GENERAL HEALTH: feels well otherwise, denied any fevers chills or night sweats   RESPIRATORY: denies shortness of breath   CARDIOVASCULAR: denies chest pain  GI: denies abdominal pain    EXAM  /70   Resp 16   Ht 5' 2.5\" (1.588 m)   Wt 183 lb (83 kg)   LMP  (LMP Unknown)   BMI 32.94 kg/m²   GENERAL: well developed, well nourished,in no apparent distress, A/O x3  SKIN: no rashes,no suspicious lesions  HEENT: atraumatic, normocephalic, OP-clear, PERRL  NECK: supple,no adenopathy  LUNGS: clear to auscultation bilaterally   CARDIO: RRR without murmur  GI: good BS's,NT/ND, no masses or HSM  EXTREMITIES: no cyanosis, no clubbing, no edema    Lab Results   Component Value Date    WBC 4.5 07/16/2024    RBC 3.68 (L) 07/16/2024    HGB 13.1 07/16/2024    HCT 39.5 07/16/2024    .3 (H) 07/16/2024    MCH 35.6 (H) 07/16/2024    MCHC 33.2 07/16/2024    RDW 11.9 07/16/2024    .0  07/16/2024     Lab Results   Component Value Date    GLU 88 07/16/2024    BUN 9 07/16/2024    BUNCREA 11.1 10/20/2020    CREATSERUM 0.76 07/16/2024    ANIONGAP 8 07/16/2024    GFRNAA 80 04/26/2022    GFRAA 93 04/26/2022    CA 9.6 07/16/2024    OSMOCALC 280 07/16/2024    ALKPHO 95 07/16/2024    AST 20 07/16/2024    ALT 13 07/16/2024    BILT 0.4 07/16/2024    TP 8.7 (H) 07/16/2024    ALB 4.5 07/16/2024    GLOBULIN 4.2 07/16/2024     07/16/2024    K 3.8 07/16/2024     07/16/2024    CO2 27.0 07/16/2024     Lab Results   Component Value Date     07/16/2024    A1C 5.7 (H) 07/16/2024     Lab Results   Component Value Date    CHOLEST 276 (H) 07/16/2024    TRIG 127 07/16/2024    HDL 46 07/16/2024     (H) 07/16/2024    VLDL 27 07/16/2024    NONHDLC 230 (H) 07/16/2024    CHOLHDLRATIO 4 06/14/2023     Lab Results   Component Value Date    T4F 0.6 (L) 12/29/2022    TSH 4.713 07/16/2024     Lab Results   Component Value Date    B12 1,350 (H) 02/03/2020     No results found for: \"VITD\", \"QVITD\", \"[id number]\"    Medications Ordered Prior to Encounter[1]    ASSESSMENT  Analyzed weight data:       Diagnoses and all orders for this visit:    Encounter for therapeutic drug level monitoring    Class 1 obesity with serious comorbidity and body mass index (BMI) of 32.0 to 32.9 in adult, unspecified obesity type    Mixed hyperlipidemia    Prediabetes    Acquired hypothyroidism    Benign essential tremor    Other orders  -     metFORMIN  MG Oral Tablet 24 Hr; Take 2 tablets (1,000 mg total) by mouth daily.        PLAN  Initial Weight: 183 lbs  Initial Weight Date: 04/05/24  Today's Weight: 183 lbs  5% Goal: 9.15  10% Goal: 18.3  Total Weight Loss: 0 lbs    Will continue and increase metformin - advised side effects and adverse effects of medication   Discussed possible change or addition of topamax  Prediabetes - continue current medications, low carb diet  HLD - stable on current medication zetia, will  continue, will continue to work on lifestyle modifications  Hypothyroidism - stable, continue current medications  Tremor - continue propranolol, evaluating for Parkinsons, continue to f/u with neuro  Stress has been higher, discussed stress management, meditation, mindfulness, yoga, reading, coloring, puzzles, etc.  Consistency with logging foods - discussed macronutrients  Incorporate more strength/resistance training  Nutrition: low carb diet/ recommended to eat breakfast daily/ regular protein intake  Medication use and side effects reviewed with patient.  Medication contraindications: stimulant, GLP-1 caution father had thyroid cancer   Follow up with dietitian and psychologist as recommended.  Discussed the role of sleep and stress in weight management.  Counseled on comprehensive weight loss plan including attention to nutrition, exercise and behavior/stress management for success. See patient instruction below for more details.  Discussed strategies to overcome barriers to successful weight loss and weight maintenance  FITTE: ACSM recommendations (150-300 minutes/ week in active weight loss)   Weight Loss consent to treat reviewed and signed       NOTE TO PATIENT: The 21st Century Cures Act makes clinical notes like these available to patients in the interest of transparency. Clinical notes are medical documents used by physicians and care providers to communicate with each other. These documents include medical language and terminology, abbreviations, and treatment information that may sound technical and at times possibly unfamiliar. In addition, at times, the verbiage may appear blunt or direct. These documents are one tool providers use to communicate relevant information and clinical opinions of the care providers in a way that allows common understanding of the clinical context.     There are no Patient Instructions on file for this visit.    No follow-ups on file.    Patient verbalizes  understanding.    Nydia Lr PA-C  12/16/2024           [1]   Current Outpatient Medications on File Prior to Visit   Medication Sig Dispense Refill    levothyroxine 75 MCG Oral Tab Take 1 tablet (75 mcg total) by mouth before breakfast. **30 day refill given, appointment and lab orders needed for further refills. 30 tablet 0    Propranolol HCl ER 60 MG Oral Capsule SR 24 Hr Take 1 capsule (60 mg total) by mouth every evening. 30 capsule 2    DULoxetine 60 MG Oral Cap DR Particles Take 1 capsule (60 mg total) by mouth daily. 90 capsule 3    ezetimibe 10 MG Oral Tab Take 1 tablet (10 mg total) by mouth daily.      FIBER OR Take by mouth.      Multiple Vitamin (MULTI-VITAMIN OR) Take 1 tablet by mouth daily.         No current facility-administered medications on file prior to visit.

## 2024-12-20 RX ORDER — LEVOTHYROXINE SODIUM 75 UG/1
75 TABLET ORAL
Qty: 20 TABLET | Refills: 0 | Status: SHIPPED | OUTPATIENT
Start: 2024-12-20

## 2024-12-20 RX ORDER — DULOXETIN HYDROCHLORIDE 60 MG/1
60 CAPSULE, DELAYED RELEASE ORAL DAILY
Qty: 30 CAPSULE | Refills: 0 | Status: SHIPPED | OUTPATIENT
Start: 2024-12-20

## 2024-12-20 NOTE — TELEPHONE ENCOUNTER
Dr. Alicea, please kindly review; protocol failed    12/16/24 refill encounter: [place] message sent, reminding patient of lab orders placed 7/18/24 and needing to be completed at least 1 week prior to her scheduled appointment.    Future Appointments   Date Time Provider Department Center   1/9/2025  1:40 PM Servando Alicea MD [facility]     Last office visit: 10/6/2023    Requested Prescriptions   Pending Prescriptions Disp Refills    DULOXETINE 60 MG Oral Cap DR Particles [Pharmacy Med Name: DULOXETINE DR 60MG CAPSULES] 90 capsule 3     Sig: TAKE 1 CAPSULE(60 MG) BY MOUTH DAILY       Psychiatric Non-Scheduled (Anti-Anxiety) Failed - 12/20/2024 12:06 PM        Failed - Depression Screening completed within the past 12 months        Passed - In person appointment or virtual visit in the past 6 mos or appointment in next 3 mos     Recent Outpatient Visits              4 days ago Encounter for therapeutic drug level monitoring    [facility], [address] [place], [place] [address]Nydia PA-C    Office Visit    1 month ago Parkinsonism, unspecified Parkinsonism type (HCC)    [facility] Esvin Jaeger MD    Office Visit    7 months ago Class 1 obesity with serious comorbidity and body mass index (BMI) of 32.0 to 32.9 in adult, unspecified obesity type    [address] Valeri Red[address]    Office Visit    8 months ago Encounter for therapeutic drug level monitoring    [address]    Office Visit    1 year ago Routine general medical examination at a health care facility    [address] Servando Alicea MD    Office Visit          Future Appointments         Provider Department Appt Notes    In 2 weeks Servando Alicea MD [address] Last Cpe 10/6/23.    In 1  month EH NUC DOSE [facility] Nuclear Medicine     In 1 month EH NUC [address] Nuclear Medicine     In 1 month Esvin Jaeger MD [facility] 3 mon f/u Parkinsonism, unspecified Parkinsonism type (HCC) G20.C, Benign essential tremor G25.0, Bradykinesia R25.8                         Future Appointments         Provider Department Appt Notes    In 2 weeks Servando Alicea MD [address] Last Cpe 10/6/23.    In 1 month EH NUC DOSE [facility] Nuclear Medicine     In 1 month EH NUC [address] Nuclear Medicine     In 1 month Esvin Jaeger MD [facility] 3 mon f/u Parkinsonism, unspecified Parkinsonism type (HCC) G20.C, Benign essential tremor G25.0, Bradykinesia R25.8          Recent Outpatient Visits              4 days ago Encounter for therapeutic drug level monitoring    [facility], [address] [place] [address] Nydia Lr PA-C    Office Visit    1 month ago Parkinsonism, unspecified Parkinsonism type (HCC)    [facility] Esvin Jaeger MD    Office Visit    7 months ago Class 1 obesity with serious comorbidity and body mass index (BMI) of 32.0 to 32.9 in adult, unspecified obesity type    [address]    Office Visit    8 months ago Encounter for therapeutic drug level monitoring    [address] Nydia Lr PA-C    Office Visit    1 year ago Routine general medical examination at a health care facility    [address] Servando Alicea MD    Office Visit

## 2024-12-20 NOTE — TELEPHONE ENCOUNTER
Refill passes per [facility] protocol.    [place] message sent, reminding patient of lab orders placed 7/18/2024 and needing to be completed 1 week prior to upcoming appointment.    Future Appointments   Date Time Provider Department Center   1/9/2025  1:40 PM Servando Alicea MD [facility]     Last office visit: 10/6/2023    Requested Prescriptions   Pending Prescriptions Disp Refills    LEVOTHYROXINE 75 MCG Oral Tab [Pharmacy Med Name: LEVOTHYROXINE 0.075MG (75MCG) TABS] 30 tablet 0     Sig: TAKE 1 TABLET(75 MCG) BY MOUTH BEFORE BREAKFAST.       Thyroid Medication Protocol Passed - 12/20/2024 11:56 AM        Passed - TSH in past 12 months        Passed - Last TSH value is normal     Lab Results   Component Value Date    TSH 4.713 07/16/2024                 Passed - In person appointment or virtual visit in the past 12 mos or appointment in next 3 mos     Recent Outpatient Visits              4 days ago Encounter for therapeutic drug level monitoring    [facility], [address] [place] [address] Nydia Lr PA-C    Office Visit    1 month ago Parkinsonism, unspecified Parkinsonism type (HCC)    [facility] Esvin Jaeger MD    Office Visit    7 months ago Class 1 obesity with serious comorbidity and body mass index (BMI) of 32.0 to 32.9 in adult, unspecified obesity type    [address]eri [address]    Office Visit    8 months ago Encounter for therapeutic drug level monitoring    [address] Nydia Lr PA-C    Office Visit    1 year ago Routine general medical examination at a health care facility    [address] Servando Alicea MD    Office Visit          Future Appointments         Provider Department Appt Notes    In 2 weeks Servando Alicea MD [address],  [place] Last Cpe 10/6/23.    In 1 month EH NUC DOSE [facility] Nuclear Medicine     In 1 month EH NUC [address] Nuclear Medicine     In 1 month Esvin Jaeger MD [facility] 3 mon f/u Parkinsonism, unspecified Parkinsonism type (HCC) G20.C, Benign essential tremor G25.0, Bradykinesia R25.8                         Future Appointments         Provider Department Appt Notes    In 2 weeks Servando Alicea MD [address] Last Cpe 10/6/23.    In 1 month EH NUC DOSE [facility] Nuclear Medicine     In 1 month EH NUC [facility] Nuclear Medicine     In 1 month Esvin Jaeger MD [facility] 3 mon f/u Parkinsonism, unspecified Parkinsonism type (HCC) G20.C, Benign essential tremor G25.0, Bradykinesia R25.8          Recent Outpatient Visits              4 days ago Encounter for therapeutic drug level monitoring    [facility], [address] [place] [address] Nydia Lr PA-C    Office Visit    1 month ago Parkinsonism, unspecified Parkinsonism type (HCC)    [facility] Esvin Jaeger MD    Office Visit    7 months ago Class 1 obesity with serious comorbidity and body mass index (BMI) of 32.0 to 32.9 in adult, unspecified obesity type    [address]    Office Visit    8 months ago Encounter for therapeutic drug level monitoring    [address] Nydia Lr PA-C    Office Visit    1 year ago Routine general medical examination at a health care facility    [address] Servando Alicea MD    Office Visit

## 2025-01-09 ENCOUNTER — OFFICE VISIT (OUTPATIENT)
Dept: INTERNAL MEDICINE CLINIC | Facility: CLINIC | Age: 65
End: 2025-01-09
Payer: MEDICARE

## 2025-01-09 ENCOUNTER — LAB ENCOUNTER (OUTPATIENT)
Dept: LAB | Age: 65
End: 2025-01-09
Attending: INTERNAL MEDICINE
Payer: MEDICARE

## 2025-01-09 VITALS
DIASTOLIC BLOOD PRESSURE: 76 MMHG | BODY MASS INDEX: 32.3 KG/M2 | OXYGEN SATURATION: 98 % | HEART RATE: 77 BPM | HEIGHT: 62.5 IN | SYSTOLIC BLOOD PRESSURE: 124 MMHG | WEIGHT: 180 LBS | TEMPERATURE: 99 F | RESPIRATION RATE: 16 BRPM

## 2025-01-09 DIAGNOSIS — D75.89 MACROCYTOSIS WITHOUT ANEMIA: ICD-10-CM

## 2025-01-09 DIAGNOSIS — Z78.9 STATIN INTOLERANCE: ICD-10-CM

## 2025-01-09 DIAGNOSIS — E66.811 CLASS 1 OBESITY: ICD-10-CM

## 2025-01-09 DIAGNOSIS — E78.00 PURE HYPERCHOLESTEROLEMIA: ICD-10-CM

## 2025-01-09 DIAGNOSIS — G20.C PARKINSONISM, UNSPECIFIED PARKINSONISM TYPE (HCC): ICD-10-CM

## 2025-01-09 DIAGNOSIS — R73.03 PREDIABETES: ICD-10-CM

## 2025-01-09 DIAGNOSIS — F41.9 ANXIETY AND DEPRESSION: ICD-10-CM

## 2025-01-09 DIAGNOSIS — G25.0 BENIGN ESSENTIAL TREMOR: ICD-10-CM

## 2025-01-09 DIAGNOSIS — F32.A ANXIETY AND DEPRESSION: ICD-10-CM

## 2025-01-09 DIAGNOSIS — E03.9 ACQUIRED HYPOTHYROIDISM: ICD-10-CM

## 2025-01-09 DIAGNOSIS — D75.89 MACROCYTOSIS: ICD-10-CM

## 2025-01-09 DIAGNOSIS — Z00.00 ENCOUNTER FOR ANNUAL HEALTH EXAMINATION: Primary | ICD-10-CM

## 2025-01-09 PROBLEM — E78.2 MIXED HYPERLIPIDEMIA: Status: RESOLVED | Noted: 2023-10-06 | Resolved: 2025-01-09

## 2025-01-09 LAB
ALBUMIN SERPL-MCNC: 4.4 G/DL (ref 3.2–4.8)
ALBUMIN/GLOB SERPL: 1.2 {RATIO} (ref 1–2)
ALP LIVER SERPL-CCNC: 76 U/L
ALT SERPL-CCNC: 15 U/L
ANION GAP SERPL CALC-SCNC: 9 MMOL/L (ref 0–18)
AST SERPL-CCNC: 22 U/L (ref ?–34)
BILIRUB SERPL-MCNC: 0.7 MG/DL (ref 0.2–1.1)
BUN BLD-MCNC: 12 MG/DL (ref 9–23)
CALCIUM BLD-MCNC: 9.6 MG/DL (ref 8.7–10.4)
CHLORIDE SERPL-SCNC: 100 MMOL/L (ref 98–112)
CHOLEST SERPL-MCNC: 213 MG/DL (ref ?–200)
CO2 SERPL-SCNC: 29 MMOL/L (ref 21–32)
CREAT BLD-MCNC: 0.86 MG/DL
EGFRCR SERPLBLD CKD-EPI 2021: 75 ML/MIN/1.73M2 (ref 60–?)
EST. AVERAGE GLUCOSE BLD GHB EST-MCNC: 123 MG/DL (ref 68–126)
FASTING PATIENT LIPID ANSWER: YES
FASTING STATUS PATIENT QL REPORTED: YES
FOLATE SERPL-MCNC: 20.4 NG/ML (ref 5.4–?)
GLOBULIN PLAS-MCNC: 3.8 G/DL (ref 2–3.5)
GLUCOSE BLD-MCNC: 114 MG/DL (ref 70–99)
HBA1C MFR BLD: 5.9 % (ref ?–5.7)
HDLC SERPL-MCNC: 53 MG/DL (ref 40–59)
LDLC SERPL CALC-MCNC: 144 MG/DL (ref ?–100)
NONHDLC SERPL-MCNC: 160 MG/DL (ref ?–130)
OSMOLALITY SERPL CALC.SUM OF ELEC: 287 MOSM/KG (ref 275–295)
POTASSIUM SERPL-SCNC: 3.6 MMOL/L (ref 3.5–5.1)
PROT SERPL-MCNC: 8.2 G/DL (ref 5.7–8.2)
SODIUM SERPL-SCNC: 138 MMOL/L (ref 136–145)
T4 FREE SERPL-MCNC: 1.3 NG/DL (ref 0.8–1.7)
TRIGL SERPL-MCNC: 90 MG/DL (ref 30–149)
TSI SER-ACNC: 5.12 UIU/ML (ref 0.55–4.78)
VIT B12 SERPL-MCNC: 1043 PG/ML (ref 211–911)
VLDLC SERPL CALC-MCNC: 17 MG/DL (ref 0–30)

## 2025-01-09 PROCEDURE — 83036 HEMOGLOBIN GLYCOSYLATED A1C: CPT

## 2025-01-09 PROCEDURE — 84443 ASSAY THYROID STIM HORMONE: CPT

## 2025-01-09 PROCEDURE — 99214 OFFICE O/P EST MOD 30 MIN: CPT | Performed by: INTERNAL MEDICINE

## 2025-01-09 PROCEDURE — 84439 ASSAY OF FREE THYROXINE: CPT

## 2025-01-09 PROCEDURE — 82607 VITAMIN B-12: CPT

## 2025-01-09 PROCEDURE — G0402 INITIAL PREVENTIVE EXAM: HCPCS | Performed by: INTERNAL MEDICINE

## 2025-01-09 PROCEDURE — 80053 COMPREHEN METABOLIC PANEL: CPT

## 2025-01-09 PROCEDURE — 82746 ASSAY OF FOLIC ACID SERUM: CPT

## 2025-01-09 PROCEDURE — 36415 COLL VENOUS BLD VENIPUNCTURE: CPT

## 2025-01-09 PROCEDURE — 80061 LIPID PANEL: CPT

## 2025-01-09 NOTE — PROGRESS NOTES
Subjective:   Ly Pelaez is a 64 year old female who presents for a Medicare Initial Preventative Physical Exam (Welcome to Medicare- < 12 months on Medicare) and scheduled follow up of multiple significant but stable problems.     The patient has maintained follow-up with the neurology service for further evaluation of the bilateral hand tremor.  She is scheduled for this further evaluation on 1/21.  Her symptoms have improved to some degree following initiation of propranolol therapy now increased to once daily extended release formulation at 60 mg.  No acute concerns at this time.    History/Other:   Fall Risk Assessment:   She has been screened for Falls and is low risk.      Cognitive Assessment:   She had a completely normal cognitive assessment - see flowsheet entries     Functional Ability/Status:   Ly Pelaez has some abnormal functions as listed below:  She has difficulties Affording Meds based on screening of functional status. She has Vision problems based on screening of functional status.       Depression Screening (PHQ):  PHQ-2 SCORE: 0  , done 1/9/2025        12 minutes spent screening and counseling for depression    Advanced Directives:   She does NOT have a Living Will. [Do you have a living will?: No]  She does NOT have a Power of  for Health Care. [Do you have a healthcare power of ?: No]  Discussed Advance Care Planning with patient (and family/surrogate if present). Standard forms made available to patient in After Visit Summary.      Patient Active Problem List   Diagnosis    Anxiety and depression    Macrocytosis without anemia    Acquired hypothyroidism    Dyslipidemia    Class 1 obesity    Benign essential tremor    Hypercholesteremia    Mixed hyperlipidemia    Prediabetes     Allergies:  She has No Known Allergies.    Current Medications:  No outpatient medications have been marked as taking for the 1/9/25 encounter (Office Visit) with Servando Alicea MD.        Medical History:  She  has a past medical history of Esophageal reflux (2013) and High cholesterol.  Surgical History:  She  has a past surgical history that includes ; removal of ovarian cyst(s); and other surgical history.   Family History:  Her family history includes Arthritis in her mother; Breast Cancer in her paternal grandmother; Depression in her mother and sister; Heart Disease in her maternal grandfather; alzheimer's in her maternal grandmother; brain cancer in her paternal grandfather; shy drager syndrome in her father.  Social History:  She  reports that she has never smoked. She has never used smokeless tobacco. She reports current alcohol use of about 7.0 standard drinks of alcohol per week. She reports that she does not use drugs.    Tobacco:  She has never smoked tobacco.    CAGE Alcohol Screen:   CAGE screening score of 0 on 2025, showing low risk of alcohol abuse.      Patient Care Team:  Servando Alicea MD as PCP - General (Internal Medicine)  Cruz Campuzano MD as Psychiatrist/APN (PSYCHIATRIST)  Esvin Jaeger MD as Consulting Physician (NEUROLOGY)  Lola Arthur APRN (Nurse Practitioner Family)    Review of Systems  GENERAL: feels well otherwise  SKIN: denies any unusual skin lesions  EYES: denies blurred vision or double vision  HEENT: denies nasal congestion, sinus pain or ST  LUNGS: denies shortness of breath with exertion  CARDIOVASCULAR: denies chest pain on exertion  GI: denies abdominal pain, denies heartburn  : denies dysuria, vaginal discharge or itching, no complaint of urinary incontinence   MUSCULOSKELETAL: denies back pain  NEURO: denies headaches  PSYCHE: denies depression or anxiety  HEMATOLOGIC: denies hx of anemia  ENDOCRINE: denies thyroid history  ALL/ASTHMA: denies hx of allergy or asthma    Objective:   Physical Exam  General Appearance:  Alert, cooperative, no distress, appears stated age   Head:  Normocephalic, without obvious abnormality,  atraumatic   Eyes:  BL conjunctiva WNL   Ears:  TM WNL BL   Nose: Deferred   Throat: Deferred   Neck: Supple, symmetrical, trachea midline, no adenopathy;  thyroid: not enlarged, symmetric, no tenderness/mass/nodules; no carotid bruit or JVD   Back:   Symmetric, no curvature, ROM normal, no CVA tenderness   Lungs:   Clear to auscultation bilaterally, respirations unlabored   Heart:  Regular rate and rhythm, S1 and S2 normal, no murmur, rub, or gallop   Abdomen:   Soft, non-tender, bowel sounds active all four quadrants,  no masses, no organomegaly   Pelvic: Deferred   Extremities: No edema   Pulses: 2+ and symmetric   Skin: Skin color, texture, turgor normal, no rashes or lesions   Lymph nodes: Cervical nodes normal   Neurologic: Bilateral hand tremor       /76   Pulse 77   Temp 98.7 °F (37.1 °C)   Resp 16   Ht 5' 2.5\" (1.588 m)   Wt 180 lb (81.6 kg)   LMP  (LMP Unknown)   SpO2 98%   BMI 32.40 kg/m²  Estimated body mass index is 32.4 kg/m² as calculated from the following:    Height as of this encounter: 5' 2.5\" (1.588 m).    Weight as of this encounter: 180 lb (81.6 kg).    Medicare Hearing Assessment:   Hearing Screening    Time taken: 1/9/2025  2:33 PM  Entry User: Servando Alicea MD  Screening Method: Whisper Test  Whisper Test Result: Pass         Visual Acuity:   Right Eye Visual Acuity: Uncorrected Right Eye Chart Acuity: 20/40   Left Eye Visual Acuity: Uncorrected Left Eye Chart Acuity: 20/60   Both Eyes Visual Acuity: Uncorrected Both Eyes Chart Acuity: 20/30   Able To Tolerate Visual Acuity: Yes        Assessment & Plan:   Ly Pelaez is a 64 year old female who presents for a Medicare Assessment.     1. Encounter for annual health examination (Primary)    Prediabetes and class I obesity:  Hemoglobin A1c of 5.7% with repeat study pending collection.  Continue dietary and lifestyle efforts, and metformin therapy per weight loss clinic.    Pure hypercholesterolemia:  Most recent LDL  cholesterol of 207; repeat study pending collection  Statin intolerant  Referred to cardiology service for further evaluation and management    Macrocytosis:  Hemoglobin of 13.1 with MCV of 107.3  Vitamin B12 and folic acid levels pending collection    Anxiety and depression:   Stable and controlled with use of Cymbalta    Parkinsonism and benign essential tremor:  Following with neurology service for evaluation and management   Compliant with propranolol extended release 60 mg daily  Scheduled for further evaluation to rule out Parkinson's disease    Hypothyroidism:  Most recent TSH within normal limits  Continue current management    The patient indicates understanding of these issues and agrees to the plan.  Reinforced healthy diet, lifestyle, and exercise.      Return in 6 months (on 7/9/2025).     Servando Alicea MD, 1/9/2025     Supplementary Documentation:   General Health:  In the past six months, have you lost more than 10 pounds without trying?: 2 - No  Has your appetite been poor?: Yes  Type of Diet: Balanced  How does the patient maintain a good energy level?: Appropriate Exercise  How would you describe your daily physical activity?: Moderate  How would you describe your current health state?: Good  How do you maintain positive mental well-being?: Visiting Family;Visiting Friends  On a scale of 0 to 10, with 0 being no pain and 10 being severe pain, what is your pain level?: 0 - (None)  In the past six months, have you experienced urine leakage?: 1-Yes  At any time do you feel concerned for the safety/well-being of yourself and/or your children, in your home or elsewhere?: No  Have you had any immunizations at another office such as Influenza, Hepatitis B, Tetanus, or Pneumococcal?: No    Health Maintenance   Topic Date Due    Pneumococcal Vaccine: 50+ Years (1 of 1 - PCV) Never done    Zoster Vaccines (1 of 2) Never done    Pap Smear  02/09/2024    DEXA Scan  Never done    COVID-19 Vaccine (4 - 2024-25  season) 09/01/2024    Influenza Vaccine (1) 10/01/2024    Annual Physical  10/06/2024    Mammogram  08/02/2025    Colorectal Cancer Screening  10/01/2028    Annual Depression Screening  Completed    Fall Risk Screening (Annual)  Completed    Meningococcal B Vaccine  Aged Out

## 2025-01-10 ENCOUNTER — PATIENT MESSAGE (OUTPATIENT)
Dept: INTERNAL MEDICINE CLINIC | Facility: CLINIC | Age: 65
End: 2025-01-10

## 2025-01-10 DIAGNOSIS — G25.0 BENIGN ESSENTIAL TREMOR: Primary | ICD-10-CM

## 2025-01-10 DIAGNOSIS — E03.9 ACQUIRED HYPOTHYROIDISM: ICD-10-CM

## 2025-01-10 NOTE — TELEPHONE ENCOUNTER
Medication: PROPRANOLOL HCL ER 60 MG Oral Capsule SR 24 Hr      Date of last refill: 11/15/2024 (#30/2)  [place]     Last office visit: 11/15/2024  Due back to clinic per last office note:  Around 02/15/2025  Date next office visit scheduled:    Future Appointments   Date Time Provider Department Center   1/21/2025  8:30 AM [facility] NUC DOSE RM [facility]   1/21/2025  2:00 PM [facility] NUC RM3 [facility]   2/17/2025 10:10 AM Esvin Jaeger MD [facility]           Last OV note recommendation:       ASSESSMENT/PLAN:          ICD-10-CM     1. Parkinsonism, unspecified Parkinsonism type (HCC)  G20.C NM BRAIN TIO IOFLUPANE (SINGLE) 1 DAY (CPT=78803)       2. Benign essential tremor  G25.0 NM BRAIN TIO IOFLUPANE (SINGLE) 1 DAY (CPT=78803)       3. Bradykinesia  R25.8                  Patient is a 64-year-old female with history of benign essential tremor presented with worsening hand tremors.  On examination there are signs of Parkinsonism     We recommend NM TIO scan for further delineation and to rule out parkinsonian syndrome  Patient has a positive family history for multiple system atrophy/Shy Drager      Switch regular propranolol to propranolol extended release 60 mg every evening  If no improvement will consider starting carbidopa levodopa     Counseled on nonpharmacological intervention     Follow-up in about 3 months

## 2025-01-13 RX ORDER — PROPRANOLOL HYDROCHLORIDE 60 MG/1
1 CAPSULE, EXTENDED RELEASE ORAL EVERY EVENING
Qty: 90 CAPSULE | Refills: 0 | Status: SHIPPED | OUTPATIENT
Start: 2025-01-13

## 2025-01-13 NOTE — TELEPHONE ENCOUNTER
Servando Alicea MD  1/9/2025  5:22 PM CST       Hold B12 supplementation x 3 months and then reduce to every other day.  Significant improvement in LDL cholesterol, however mild increase in blood glucose level  Continue efforts to improve dietary and lifestyle habits and repeat labs in 3 months

## 2025-01-15 RX ORDER — LEVOTHYROXINE SODIUM 75 UG/1
75 TABLET ORAL
Qty: 90 TABLET | Refills: 3 | Status: SHIPPED | OUTPATIENT
Start: 2025-01-15

## 2025-01-15 NOTE — TELEPHONE ENCOUNTER
Please review. Protocol Failed; No Protocol    Requested Prescriptions   Pending Prescriptions Disp Refills    LEVOTHYROXINE 75 MCG Oral Tab [Pharmacy Med Name: LEVOTHYROXINE 0.075MG (75MCG) TABS] 20 tablet 0     Sig: TAKE 1 TABLET BY MOUTH BEFORE BREAKFAST       Thyroid Medication Protocol Failed - 1/14/2025  7:49 PM        Failed - Last TSH value is normal     Lab Results   Component Value Date    TSH 5.119 (H) 01/09/2025                 Passed - TSH in past 12 months        Passed - In person appointment or virtual visit in the past 12 mos or appointment in next 3 mos     Recent Outpatient Visits              5 days ago Encounter for annual health examination    [facility], [address] [place] [address] Servando Alicea MD    Office Visit    4 weeks ago Encounter for therapeutic drug level monitoring    [facility], [address] [place], [place] [address]Nydia PA-C    Office Visit    2 months ago Parkinsonism, unspecified Parkinsonism type (HCC)    [facility] Esvin Jaeger MD    Office Visit    8 months ago Class 1 obesity with serious comorbidity and body mass index (BMI) of 32.0 to 32.9 in adult, unspecified obesity type    [facility], [address] [place] [address] Valeri Reddy RD    Office Visit    9 months ago Encounter for therapeutic drug level monitoring    [address]    Office Visit          Future Appointments         Provider Department Appt Notes    In 1 week EH NUC DOSE [facility] Nuclear Medicine qa ls-secondary cov?    In 1 week EH NUC [address] Nuclear Medicine qa ls-secondary cov?    In 1 month Esvin Jaeger MD [facility] 3 mon f/u Parkinsonism, unspecified Parkinsonism type (HCC) G20.C, Benign essential tremor G25.0, Bradykinesia R25.8                    Passed -  Medication is active on med list               Future Appointments         Provider Department Appt Notes    In 1 week EH NUC DOSE RM [facility] Nuclear Medicine qa ls-secondary cov?    In 1 week EH NUC 3 [facility] Nuclear Medicine qa ls-secondary cov?    In 1 month Esvin Jaeger MD [facility], [facility] 3 mon f/u Parkinsonism, unspecified Parkinsonism type (HCC) G20.C, Benign essential tremor G25.0, Bradykinesia R25.8          Recent Outpatient Visits              5 days ago Encounter for annual health examination    [facility], [address] [place] [address] Servando Alicea MD    Office Visit    4 weeks ago Encounter for therapeutic drug level monitoring    [facility], [address] [place] [address] Nydia Lr PA-C    Office Visit    2 months ago Parkinsonism, unspecified Parkinsonism type (HCC)    [facility], [facility] Esvin Jaeger MD    Office Visit    8 months ago Class 1 obesity with serious comorbidity and body mass index (BMI) of 32.0 to 32.9 in adult, unspecified obesity type    [facility], [address] [place], [place] [address]Valeri Santacruz RD    Office Visit    9 months ago Encounter for therapeutic drug level monitoring    [facility], [address] [place] [address] Nydia Lr PA-C    Office Visit

## 2025-01-21 ENCOUNTER — HOSPITAL ENCOUNTER (OUTPATIENT)
Dept: NUCLEAR MEDICINE | Facility: HOSPITAL | Age: 65
End: 2025-01-21
Attending: Other
Payer: MEDICARE

## 2025-01-27 ENCOUNTER — HOSPITAL ENCOUNTER (OUTPATIENT)
Dept: NUCLEAR MEDICINE | Facility: HOSPITAL | Age: 65
Discharge: HOME | End: 2025-01-27
Attending: Other
Payer: MEDICARE

## 2025-01-27 ENCOUNTER — TELEPHONE (OUTPATIENT)
Dept: NEUROLOGY | Facility: CLINIC | Age: 65
End: 2025-01-27

## 2025-01-27 DIAGNOSIS — G20.C PARKINSONISM, UNSPECIFIED PARKINSONISM TYPE (HCC): ICD-10-CM

## 2025-01-27 DIAGNOSIS — G25.0 BENIGN ESSENTIAL TREMOR: ICD-10-CM

## 2025-01-27 PROCEDURE — 78803 RP LOCLZJ TUM SPECT 1 AREA: CPT | Performed by: OTHER

## 2025-01-27 NOTE — TELEPHONE ENCOUNTER
No results notes to relay yet as test completed today.    Routed to provider to request interpretation of results.

## 2025-01-27 NOTE — TELEPHONE ENCOUNTER
Patient completed NM Brain TIO Ioflupane on 1/27/25.    Please call patient to discuss test results and advise

## 2025-01-29 RX ORDER — CARBIDOPA AND LEVODOPA 25; 100 MG/1; MG/1
TABLET ORAL
Qty: 180 TABLET | Refills: 0 | OUTPATIENT
Start: 2025-01-29

## 2025-01-29 RX ORDER — CARBIDOPA AND LEVODOPA 25; 100 MG/1; MG/1
1 TABLET ORAL 2 TIMES DAILY
Qty: 60 TABLET | Refills: 0 | Status: SHIPPED | OUTPATIENT
Start: 2025-01-29

## 2025-01-29 NOTE — TELEPHONE ENCOUNTER
Patient was called with NM TIO scan result-and the findings were suspicious for parkinsonism.  We recommend.  She starts carbidopa levodopa 1 tablet 2 times a day patient was instructed to take it that you have minutes before or after the meals.  Side effects were explained.  Patient has a scheduled follow-up with in about 3 weeks     All question answered to the best of my abilities

## 2025-01-29 NOTE — TELEPHONE ENCOUNTER
Refused. Patient was recently started on this medication.       Medication: CARBIDOPA-LEVODOPA  MG Oral Tab      Date of last refill: 01/29/2025 (#60/0)  [place]     Last office visit: 11/15/2024  Due back to clinic per last office note:  Around 02/15/2025  Date next office visit scheduled:    Future Appointments   Date Time Provider Department Center   2/17/2025 10:10 AM Esvin Jaeger MD [facility]           Last OV note recommendation:    ASSESSMENT/PLAN:          ICD-10-CM     1. Parkinsonism, unspecified Parkinsonism type (HCC)  G20.C NM BRAIN TIO IOFLUPANE (SINGLE) 1 DAY (CPT=78803)       2. Benign essential tremor  G25.0 NM BRAIN TIO IOFLUPANE (SINGLE) 1 DAY (CPT=78803)       3. Bradykinesia  R25.8                  Patient is a 64-year-old female with history of benign essential tremor presented with worsening hand tremors.  On examination there are signs of Parkinsonism     We recommend NM TIO scan for further delineation and to rule out parkinsonian syndrome  Patient has a positive family history for multiple system atrophy/Shy Drager      Switch regular propranolol to propranolol extended release 60 mg every evening  If no improvement will consider starting carbidopa levodopa     Counseled on nonpharmacological intervention     Follow-up in about 3 months

## 2025-01-31 ENCOUNTER — TELEPHONE (OUTPATIENT)
Dept: INTERNAL MEDICINE CLINIC | Facility: CLINIC | Age: 65
End: 2025-01-31

## 2025-01-31 DIAGNOSIS — G20.C PARKINSONISM, UNSPECIFIED PARKINSONISM TYPE (HCC): Primary | ICD-10-CM

## 2025-01-31 NOTE — TELEPHONE ENCOUNTER
Patient is asking for a referral for a second opinion with neurology at   Patient states she wants to continue to see Dr. Jaeger but her family is encouraging a second opinion as well.     Informed patient we would need a neurologist name for this referral. Patient is to call back with a name for referral     Esvin Jaeger MD     1/29/25 11:52 AM  Note     Patient was called with NM TIO scan result-and the findings were suspicious for parkinsonism.  We recommend.  She starts carbidopa levodopa 1 tablet 2 times a day patient was instructed to take it that you have minutes before or after the meals.  Side effects were explained.  Patient has a scheduled follow-up with in about 3 weeks

## 2025-01-31 NOTE — TELEPHONE ENCOUNTER
Dr. Alicea- referral pended. Provider not pulling up in epic, so put in comments.     PSRs- please obtain fax number. Given provider not pulling up in epic, will not be sent electronically.

## 2025-01-31 NOTE — TELEPHONE ENCOUNTER
The patient called back with MD's name for referral-  Jelena Mosqueda MD- [facility].  R-

## 2025-02-03 ENCOUNTER — TELEPHONE (OUTPATIENT)
Dept: NEUROLOGY | Facility: CLINIC | Age: 65
End: 2025-02-03

## 2025-02-04 NOTE — TELEPHONE ENCOUNTER
Patient presented to office and signed KITTY.  Faxed medical records to NM Dr. Jelena Mosqueda.    Fax confirmed  Copy to scanning  
Patient stated needs medical records faxed to Dr. Jelena Mosqueda at [facility].    Advised needs release of information form completed and will fax records after form signed.    Patient understood and advised to come to [place] office to sign form and will fax records same day.    Patient understood and appreciative of assistance.  
- denies hearing aids, use [employer]

## 2025-02-11 DIAGNOSIS — G25.0 BENIGN ESSENTIAL TREMOR: ICD-10-CM

## 2025-02-12 RX ORDER — PROPRANOLOL HYDROCHLORIDE 60 MG/1
1 CAPSULE, EXTENDED RELEASE ORAL EVERY EVENING
Qty: 90 CAPSULE | Refills: 0 | Status: SHIPPED | OUTPATIENT
Start: 2025-02-12

## 2025-02-12 NOTE — TELEPHONE ENCOUNTER
Medication: Propranolol 60mg     Date of last refill: 1/13/25 (#90/0)  [place]      Last office visit: 11/15/24  Due back to clinic per last office note:  3m  Date next office visit scheduled:    Future Appointments   Date Time Provider Department Center   2/17/2025 10:10 AM Esvin Jaeger MD [facility]           Last OV note recommendation:    Patient is a 64-year-old female with history of benign essential tremor presented with worsening hand tremors.  On examination there are signs of Parkinsonism     We recommend NM TIO scan for further delineation and to rule out parkinsonian syndrome  Patient has a positive family history for multiple system atrophy/Shy Drager      Switch regular propranolol to propranolol extended release 60 mg every evening  If no improvement will consider starting carbidopa levodopa     Counseled on nonpharmacological intervention     Follow-up in about 3 months

## 2025-02-14 RX ORDER — EZETIMIBE 10 MG/1
10 TABLET ORAL DAILY
Qty: 90 TABLET | Refills: 1 | Status: SHIPPED | OUTPATIENT
Start: 2025-02-14

## 2025-02-14 NOTE — TELEPHONE ENCOUNTER
Please advise medication is patient external reported or historical.   Shruthi Frazier, Cardiology last wrote for medication     Is refill appropriate     Requested Prescriptions   Pending Prescriptions Disp Refills    ezetimibe 10 MG Oral Tab  0     Sig: Take 1 tablet (10 mg total) by mouth daily.       Cholesterol Medication Protocol Passed - 2/14/2025 12:32 PM        Passed - ALT < 80     Lab Results   Component Value Date    ALT 15 01/09/2025             Passed - ALT resulted within past year        Passed - Lipid panel within past 12 months     Lab Results   Component Value Date    CHOLEST 213 (H) 01/09/2025    TRIG 90 01/09/2025    HDL 53 01/09/2025     (H) 01/09/2025    VLDL 17 01/09/2025    NONHDLC 160 (H) 01/09/2025    CHOLHDLRATIO 4 06/14/2023             Passed - In person appointment or virtual visit in the past 12 mos or appointment in next 3 mos     Recent Outpatient Visits              1 month ago Encounter for annual health examination    [facility], [address] [place] [address] Servando Alicea MD    Office Visit    2 months ago Encounter for therapeutic drug level monitoring    [facility], [address] [place], [place] [address]Nydia nelson PA-C    Office Visit    3 months ago Parkinsonism, unspecified Parkinsonism type (HCC)    [facility] Esvin Jaeger MD    Office Visit    9 months ago Class 1 obesity with serious comorbidity and body mass index (BMI) of 32.0 to 32.9 in adult, unspecified obesity type    [address] Valeri Reddy RD    Office Visit    10 months ago Encounter for therapeutic drug level monitoring    [facility], [address] [place] [address] Nydia Lr PA-C    Office Visit          Future Appointments         Provider Department Appt Notes    In 3 days Esvin Jaeger MD [facility] 3 mon  f/u Parkinsonism, unspecified Parkinsonism type (HCC) G20.C, Benign essential tremor G25.0, Bradykinesia R25.8                    Passed - Medication is active on med list               Future Appointments         Provider Department Appt Notes    In 3 days Esvin Jaeger MD [facility], [facility] 3 mon f/u Parkinsonism, unspecified Parkinsonism type (HCC) G20.C, Benign essential tremor G25.0, Bradykinesia R25.8          Recent Outpatient Visits              1 month ago Encounter for annual health examination    [facility], [address] [place] [address] Servando Alicea MD    Office Visit    2 months ago Encounter for therapeutic drug level monitoring    [facility], [address] [place] [address] Nydia Lr PA-C    Office Visit    3 months ago Parkinsonism, unspecified Parkinsonism type (HCC)    [facility], [facility] Esvin Jaeger MD    Office Visit    9 months ago Class 1 obesity with serious comorbidity and body mass index (BMI) of 32.0 to 32.9 in adult, unspecified obesity type    [facility], [address] [place], [place] [address]Valeri Santacruz RD    Office Visit    10 months ago Encounter for therapeutic drug level monitoring    [facility], [address] [place] [address] Nydia Lr PA-C    Office Visit

## 2025-02-17 ENCOUNTER — OFFICE VISIT (OUTPATIENT)
Dept: NEUROLOGY | Facility: CLINIC | Age: 65
End: 2025-02-17
Payer: MEDICARE

## 2025-02-17 VITALS
HEART RATE: 72 BPM | DIASTOLIC BLOOD PRESSURE: 74 MMHG | WEIGHT: 185 LBS | BODY MASS INDEX: 33 KG/M2 | SYSTOLIC BLOOD PRESSURE: 128 MMHG | RESPIRATION RATE: 16 BRPM

## 2025-02-17 DIAGNOSIS — G20.A1 PARKINSON'S DISEASE WITHOUT DYSKINESIA, UNSPECIFIED WHETHER MANIFESTATIONS FLUCTUATE (HCC): Primary | ICD-10-CM

## 2025-02-17 PROCEDURE — 99214 OFFICE O/P EST MOD 30 MIN: CPT | Performed by: OTHER

## 2025-02-17 RX ORDER — CARBIDOPA AND LEVODOPA 25; 100 MG/1; MG/1
1 TABLET ORAL 2 TIMES DAILY
Qty: 60 TABLET | Refills: 3 | Status: SHIPPED | OUTPATIENT
Start: 2025-02-17

## 2025-02-17 NOTE — PROGRESS NOTES
HPI:    Patient ID: Ly Pelaez is a 64 year old female.    Neurologic Problem    Ly Pelaez is a 64-year-old right-handed female who presented for follow-up for Parkinson and to discuss NM TIO results. NM TIO scan was suspicious for Parkinsonian syndrome.  She saw Movement disorder specialist in  for second opinion and they agree with the diagnosis  Sinemet was started, symptoms has improved, feels less tired as well. She is currently taking 1 tab BID and Propranolol ER 60 mg daily. States gait and balance is fine.         Patient was seen last in 2023 for hand tremors and then she missed follow-up.  She states that tremors have increased, getting both resting and action related tremors.  She is on propranolol 20 mg twice daily which is somewhat helpful.  Patient also has anxiety which worsens the tremors.  She still denies any gait balance problem but endorses some fine motor difficulty and handgrip is getting weaker.  Patient reports her father had Shy-Drager syndrome.       Patient is a 62-year-old right-handed female who presented for evaluation of hand tremors.  She had seen me about 3 years ago for similar problem and at that time we recommended observation.  She states over the past year tremors has increased and interfering with her activities. States family and friends are noticing and having difficulty writing and with fine motor movements.  She denies any balance/gait problem, rigidity, bradykinesia. She denies any family history of essential tremors but her mother in her 90's has fine hand tremors. She is on Cymbalta 20 mg at night. No prior use of antipsychotics or multiple neuropsychotropic medications. She drinks alcohol socially.    HISTORY:  Past Medical History:    Esophageal reflux    pt gets stomach pain att night takes tums    High cholesterol      Past Surgical History:   Procedure Laterality Date          Other surgical history      laparoscopy for infertility     Removal of ovarian cyst(s)      1998, laparascopy      Family History   Problem Relation Age of Onset    Arthritis Mother     Depression Mother     Other (shy drager syndrome) Father     Other (alzheimer's) Maternal Grandmother     Other (brain cancer) Paternal Grandfather     Breast Cancer Paternal Grandmother     Depression Sister     Heart Disease Maternal Grandfather       Social History     Socioeconomic History    Marital status:    Tobacco Use    Smoking status: Never    Smokeless tobacco: Never   Vaping Use    Vaping status: Never Used   Substance and Sexual Activity    Alcohol use: Yes     Alcohol/week: 7.0 standard drinks of alcohol     Types: 7 Glasses of wine per week     Comment: CAGE 12/20/19    Drug use: No    Sexual activity: Yes     Partners: Male   Other Topics Concern    Caffeine Concern Yes     Comment: cup a day    Exercise Yes     Comment: 3 times a week      Social Drivers of Health     Food Insecurity: No Food Insecurity (1/9/2025)    NCSS - Food Insecurity     Worried About Running Out of Food in the Last Year: No     Ran Out of Food in the Last Year: No   Transportation Needs: No Transportation Needs (1/9/2025)    NCSS - Transportation     Lack of Transportation: No   Housing Stability: Not At Risk (1/9/2025)    NCSS - Housing/Utilities     Has Housing: Yes     Worried About Losing Housing: No     Unable to Get Utilities: No        Review of Systems   Constitutional: Negative.    HENT: Negative.     Eyes: Negative.    Respiratory: Negative.     Cardiovascular: Negative.    Gastrointestinal: Negative.    Endocrine: Negative.    Genitourinary: Negative.    Musculoskeletal:  Positive for gait problem.   Skin: Negative.    Allergic/Immunologic: Negative.    Neurological:  Positive for tremors.   Hematological: Negative.    Psychiatric/Behavioral: Negative.     All other systems reviewed and are negative.           Current Outpatient Medications   Medication Sig Dispense Refill     carbidopa-levodopa (SINEMET)  MG Oral Tab Take 1 tablet by mouth 2 (two) times daily. Take it 1 tab 9 am and 4 pm ( it take 30 minutes before and after meals) 60 tablet 3    ezetimibe 10 MG Oral Tab Take 1 tablet (10 mg total) by mouth daily. 90 tablet 1    Propranolol HCl ER 60 MG Oral Capsule SR 24 Hr Take 1 capsule (60 mg total) by mouth every evening. 90 capsule 0    levothyroxine 75 MCG Oral Tab Take 1 tablet (75 mcg total) by mouth before breakfast. 90 tablet 3    DULoxetine 60 MG Oral Cap DR Particles Take 1 capsule (60 mg total) by mouth daily. **Please address all refills at your upcoming appointment. 30 capsule 0    metFORMIN  MG Oral Tablet 24 Hr Take 2 tablets (1,000 mg total) by mouth daily. (Patient taking differently: Take 1 tablet (500 mg total) by mouth daily.) 180 tablet 0    FIBER OR Take by mouth.      Multiple Vitamin (MULTI-VITAMIN OR) Take 1 tablet by mouth daily.         Allergies:No Known Allergies  PHYSICAL EXAM:   Physical Exam  Blood pressure 128/74, pulse 72, resp. rate 16, weight 185 lb (83.9 kg), not currently breastfeeding.      General Appearance: Well nourished, well developed, no apparent distress.   HEENT: Normocephalic and atraumatic.  Cardiovascular: Normal rate, regular rhythm and normal heart sounds.    Pulmonary/Chest: Effort normal and breath sounds normal.   Abdominal: Soft. Bowel sounds are normal.       Mental Status Exam: Patient is awake, alert and oriented to person, place and time   Normal memory, fund of knowledge, attention/concentration and language.    Cranial Nerves:   II: Visual acuity: normal    III: Pupils: equal, round, reactive to light  III,IV,VI: Extra Ocular Movements: intact  V: Facial sensation: intact  VII: Facial strength: intact  VIII: Hearing: intact  IX: Palate: intact  XI: Shoulder shrug: intact  XII: Tongue movement: normal    Motor Exam: + Cogwheel rigidity right wrist.  + bradykinesia and mild hypomimia  right hand >left hand both  resting and postural hand tremors    Sensory: Sensory examination is normal to light touch and pinprick     Coordination: Finger-to-nose  normal bilaterally without evidence of dysmetria.    Gait: normal based and fair foot stride, decrease arm swing right, no shuffling gait        ASSESSMENT/PLAN:       ICD-10-CM    1. Parkinson's disease without dyskinesia, unspecified whether manifestations fluctuate ([facility])  G20.A1             Patient is a 64-year-old female with newly diagnosed Parkinson disease  NM TIO scan obtained was positive and suspicious for parkinsonian syndrome  She also had second opinion at [facility] and they agree with the diagnosis    Continue Sinemet  mg,  currently taking 1 tab BID may increase it to TID  Continue Propranolol ER 60 mg for tremor control      Counseled on nonpharmacological intervention    Follow-up in about 3 months        Esvin Jaeger MD  [facility]    This note was prepared using Dragon Medical voice recognition dictation software. As a result errors may occur. When identified these errors have been corrected. While every attempt is made to correct errors during dictation discrepancies may still exist       Meds This Visit:  Requested Prescriptions     Signed Prescriptions Disp Refills    carbidopa-levodopa (SINEMET)  MG Oral Tab 60 tablet 3     Sig: Take 1 tablet by mouth 2 (two) times daily. Take it 1 tab 9 am and 4 pm ( it take 30 minutes before and after meals)       Imaging & Referrals:  None     ID#[id number]

## 2025-02-17 NOTE — PATIENT INSTRUCTIONS
Refill policies:    Allow 2-3 business days for refills; controlled substances may take longer.  Contact your pharmacy at least 5 days prior to running out of medication and have them send an electronic request or submit request through the “request refill” option in your [employer] account.  Refills are not addressed on weekends; covering physicians do not authorize routine medications on weekends.  No narcotics or controlled substances are refilled after noon on Fridays or by on call physicians.  By law, narcotics must be electronically prescribed.  A 30 day supply with no refills is the maximum allowed.  If your prescription is due for a refill, you may be due for a follow up appointment.  To best provide you care, patients receiving routine medications need to be seen at least once a year.  Patients receiving narcotic/controlled substance medications need to be seen at least once every 3 months.  In the event that your preferred pharmacy does not have the requested medication in stock (e.g. Backordered), it is your responsibility to find another pharmacy that has the requested medication available.  We will gladly send a new prescription to that pharmacy at your request.    Scheduling Tests:    If your physician has ordered radiology tests such as MRI or CT scans, please contact Central Scheduling at [phone] right away to schedule the test.  Once scheduled, the [facility] Centralized Referral Team will work with your insurance carrier to obtain pre-certification or prior authorization.  Depending on your insurance carrier, approval may take 3-10 days.  It is highly recommended patients assure they have received an authorization before having a test performed.  If test is done without insurance authorization, patient may be responsible for the entire amount billed.      Precertification and Prior Authorizations:  If your physician has recommended that you have a procedure or additional testing performed the [facility]  Centralized Referral Team will contact your insurance carrier to obtain pre-certification or prior authorization.    You are strongly encouraged to contact your insurance carrier to verify that your procedure/test has been approved and is a COVERED benefit.  Although the [facility] Centralized Referral Team does its due diligence, the insurance carrier gives the disclaimer that \"Although the procedure is authorized, this does not guarantee payment.\"    Ultimately the patient is responsible for payment.   Thank you for your understanding in this matter.  Paperwork Completion:  If you require FMLA or disability paperwork for your recovery, please make sure to either drop it off or have it faxed to our office at [phone]. Be sure the form has your name and date of birth on it.  The form will be faxed to our Forms Department and they will complete it for you.  There is a 25$ fee for all forms that need to be filled out.  Please be aware there is a 10-14 day turnaround time.  You will need to sign a release of information (KITTY) form if your paperwork does not come with one.  You may call the Forms Department with any questions at [phone].  Their fax number is [phone].

## 2025-06-13 RX ORDER — CARBIDOPA AND LEVODOPA 25; 100 MG/1; MG/1
TABLET ORAL
Qty: 60 TABLET | Refills: 3 | Status: SHIPPED | OUTPATIENT
Start: 2025-06-13

## 2025-06-13 NOTE — TELEPHONE ENCOUNTER
Medication: CARBIDOPA-LEVODOPA  MG Oral Tab      Date of last refill: 02/17/2025 (#60/3)  [place]     Last office visit: 02/17/2025  Due back to clinic per last office note:  3 months   Date next office visit scheduled:    Future Appointments   Date Time Provider Department Center   6/18/2025  2:40 PM Esvin Jaeger MD [facility]           Last OV note recommendation:    ASSESSMENT/PLAN:          ICD-10-CM     1. Parkinson's disease without dyskinesia, unspecified whether manifestations fluctuate ([facility])  G20.A1                  Patient is a 64-year-old female with newly diagnosed Parkinson disease  NM TIO scan obtained was positive and suspicious for parkinsonian syndrome  She also had second opinion at  medicine and they agree with the diagnosis     Continue Sinemet  mg,  currently taking 1 tab BID may increase it to TID  Continue Propranolol ER 60 mg for tremor control        Counseled on nonpharmacological intervention     Follow-up in about 3 months           Esvin Jaeger MD  [facility]     This note was prepared using Dragon Medical voice recognition dictation software. As a result errors may occur. When identified these errors have been corrected. While every attempt is made to correct errors during dictation discrepancies may still exist        Meds This Visit:  Requested Prescriptions             Signed Prescriptions Disp Refills    carbidopa-levodopa (SINEMET)  MG Oral Tab 60 tablet 3       Sig: Take 1 tablet by mouth 2 (two) times daily. Take it 1 tab 9 am and 4 pm ( it take 30 minutes before and after meals)         Imaging & Referrals:  None      ID#[id number]

## 2025-06-18 ENCOUNTER — OFFICE VISIT (OUTPATIENT)
Dept: NEUROLOGY | Facility: CLINIC | Age: 65
End: 2025-06-18
Payer: MEDICARE

## 2025-06-18 VITALS
SYSTOLIC BLOOD PRESSURE: 108 MMHG | BODY MASS INDEX: 33 KG/M2 | HEART RATE: 77 BPM | WEIGHT: 184 LBS | DIASTOLIC BLOOD PRESSURE: 70 MMHG | RESPIRATION RATE: 16 BRPM

## 2025-06-18 DIAGNOSIS — G20.A1 PARKINSON'S DISEASE WITHOUT DYSKINESIA, UNSPECIFIED WHETHER MANIFESTATIONS FLUCTUATE (HCC): Primary | ICD-10-CM

## 2025-06-18 DIAGNOSIS — G25.0 BENIGN ESSENTIAL TREMOR: ICD-10-CM

## 2025-06-18 PROCEDURE — 99214 OFFICE O/P EST MOD 30 MIN: CPT | Performed by: OTHER

## 2025-06-18 RX ORDER — CARBIDOPA AND LEVODOPA 25; 100 MG/1; MG/1
1 TABLET ORAL 2 TIMES DAILY
Qty: 60 TABLET | Refills: 5 | Status: SHIPPED | OUTPATIENT
Start: 2025-06-18

## 2025-06-18 RX ORDER — PROPRANOLOL HYDROCHLORIDE 60 MG/1
1 CAPSULE, EXTENDED RELEASE ORAL EVERY EVENING
Qty: 90 CAPSULE | Refills: 1 | Status: SHIPPED | OUTPATIENT
Start: 2025-06-18

## 2025-06-18 NOTE — PATIENT INSTRUCTIONS
Refill policies:    Allow 2-3 business days for refills; controlled substances may take longer.  Contact your pharmacy at least 5 days prior to running out of medication and have them send an electronic request or submit request through the “request refill” option in your [email or web address] account.  Refills are not addressed on weekends; covering physicians do not authorize routine medications on weekends.  No narcotics or controlled substances are refilled after noon on Fridays or by on call physicians.  By law, narcotics must be electronically prescribed.  A 30 day supply with no refills is the maximum allowed.  If your prescription is due for a refill, you may be due for a follow up appointment.  To best provide you care, patients receiving routine medications need to be seen at least once a year.  Patients receiving narcotic/controlled substance medications need to be seen at least once every 3 months.  In the event that your preferred pharmacy does not have the requested medication in stock (e.g. Backordered), it is your responsibility to find another pharmacy that has the requested medication available.  We will gladly send a new prescription to that pharmacy at your request.    Scheduling Tests:    If your physician has ordered radiology tests such as MRI or CT scans, please contact Central Scheduling at [phone] right away to schedule the test.  Once scheduled, the [facility] Centralized Referral Team will work with your insurance carrier to obtain pre-certification or prior authorization.  Depending on your insurance carrier, approval may take 3-10 days.  It is highly recommended patients assure they have received an authorization before having a test performed.  If test is done without insurance authorization, patient may be responsible for the entire amount billed.      Precertification and Prior Authorizations:  If your physician has recommended that you have a procedure or additional testing performed the [facility]  Centralized Referral Team will contact your insurance carrier to obtain pre-certification or prior authorization.    You are strongly encouraged to contact your insurance carrier to verify that your procedure/test has been approved and is a COVERED benefit.  Although the [facility] Centralized Referral Team does its due diligence, the insurance carrier gives the disclaimer that \"Although the procedure is authorized, this does not guarantee payment.\"    Ultimately the patient is responsible for payment.   Thank you for your understanding in this matter.  Paperwork Completion:  If you require FMLA or disability paperwork for your recovery, please make sure to either drop it off or have it faxed to our office at [phone]. Be sure the form has your name and date of birth on it.  The form will be faxed to our Forms Department and they will complete it for you.  There is a 25$ fee for all forms that need to be filled out.  Please be aware there is a 10-14 day turnaround time.  You will need to sign a release of information (KITTY) form if your paperwork does not come with one.  You may call the Forms Department with any questions at [phone].  Their fax number is [phone].

## 2025-06-18 NOTE — PROGRESS NOTES
HPI:    Patient ID: Ly Pelaez is a 64 year old female.    Neurologic Problem        Patient is a 64-year-old female presented for follow-up for Parkinson's   states that she is doing well and medications is working good.  She is taking carbidopa levodopa 25/100 mg 1 tablet only twice daily.  And she takes propranolol 60 mg in the evening for tremors.  Reports she went to [facility] and had evaluation done. They recommended some exercises at home          Ly Pelaez is a 64-year-old right-handed female who presented for follow-up for Parkinson and to discuss NM TIO results. NM TIO scan was suspicious for Parkinsonian syndrome.  She saw Movement disorder specialist in  for second opinion and they agree with the diagnosis  Sinemet was started, symptoms has improved, feels less tired as well. She is currently taking 1 tab BID and Propranolol ER 60 mg daily. States gait and balance is fine.         Patient was seen last in June 2023 for hand tremors and then she missed follow-up.  She states that tremors have increased, getting both resting and action related tremors.  She is on propranolol 20 mg twice daily which is somewhat helpful.  Patient also has anxiety which worsens the tremors.  She still denies any gait balance problem but endorses some fine motor difficulty and handgrip is getting weaker.  Patient reports her father had Shy-Drager syndrome.       Patient is a 62-year-old right-handed female who presented for evaluation of hand tremors.  She had seen me about 3 years ago for similar problem and at that time we recommended observation.  She states over the past year tremors has increased and interfering with her activities. States family and friends are noticing and having difficulty writing and with fine motor movements.  She denies any balance/gait problem, rigidity, bradykinesia. She denies any family history of essential tremors but her mother in her 90's has fine hand tremors. She is on  Cymbalta 20 mg at night. No prior use of antipsychotics or multiple neuropsychotropic medications. She drinks alcohol socially.    HISTORY:  Past Medical History:    Esophageal reflux    pt gets stomach pain att night takes tums    High cholesterol      Past Surgical History:   Procedure Laterality Date          Other surgical history      laparoscopy for infertility    Removal of ovarian cyst(s)      , laparascopy      Family History   Problem Relation Age of Onset    Arthritis Mother     Depression Mother     Other (shy drager syndrome) Father     Other (alzheimer's) Maternal Grandmother     Other (brain cancer) Paternal Grandfather     Breast Cancer Paternal Grandmother     Depression Sister     Heart Disease Maternal Grandfather       Social History     Socioeconomic History    Marital status:    Tobacco Use    Smoking status: Never    Smokeless tobacco: Never   Vaping Use    Vaping status: Never Used   Substance and Sexual Activity    Alcohol use: Yes     Alcohol/week: 7.0 standard drinks of alcohol     Types: 7 Glasses of wine per week     Comment: CAGE 19    Drug use: No    Sexual activity: Yes     Partners: Male   Other Topics Concern    Caffeine Concern Yes     Comment: cup a day    Exercise Yes     Comment: 3 times a week      Social Drivers of Health     Food Insecurity: No Food Insecurity (2025)    NCSS - Food Insecurity     Worried About Running Out of Food in the Last Year: No     Ran Out of Food in the Last Year: No   Transportation Needs: No Transportation Needs (2025)    NCSS - Transportation     Lack of Transportation: No   Housing Stability: Not At Risk (2025)    NCSS - Housing/Utilities     Has Housing: Yes     Worried About Losing Housing: No     Unable to Get Utilities: No        Review of Systems   Constitutional: Negative.    HENT: Negative.     Eyes: Negative.    Respiratory: Negative.     Cardiovascular: Negative.    Gastrointestinal: Negative.     Endocrine: Negative.    Genitourinary: Negative.    Musculoskeletal:  Positive for gait problem.   Skin: Negative.    Allergic/Immunologic: Negative.    Neurological:  Positive for tremors.   Hematological: Negative.    Psychiatric/Behavioral: Negative.     All other systems reviewed and are negative.           Current Outpatient Medications   Medication Sig Dispense Refill    CARBIDOPA-LEVODOPA  MG Oral Tab TAKE 1 TABLET BY MOUTH AT 9AM AND 4PM( TAKE 30 MINUTES BEFORE AND AFTER MEALS) 60 tablet 3    ezetimibe 10 MG Oral Tab Take 1 tablet (10 mg total) by mouth daily. 90 tablet 1    Propranolol HCl ER 60 MG Oral Capsule SR 24 Hr Take 1 capsule (60 mg total) by mouth every evening. 90 capsule 0    levothyroxine 75 MCG Oral Tab Take 1 tablet (75 mcg total) by mouth before breakfast. 90 tablet 3    DULoxetine 60 MG Oral Cap DR Particles Take 1 capsule (60 mg total) by mouth daily. **Please address all refills at your upcoming appointment. 30 capsule 0    metFORMIN  MG Oral Tablet 24 Hr Take 2 tablets (1,000 mg total) by mouth daily. (Patient taking differently: Take 1 tablet (500 mg total) by mouth in the morning.) 180 tablet 0    FIBER OR Take by mouth.      Multiple Vitamin (MULTI-VITAMIN OR) Take 1 tablet by mouth daily.         Allergies:No Known Allergies  PHYSICAL EXAM:   Physical Exam  Blood pressure 108/70, pulse 77, resp. rate 16, weight 184 lb (83.5 kg), not currently breastfeeding.      General Appearance: Well nourished, well developed, no apparent distress.   HEENT: Normocephalic and atraumatic.  Cardiovascular: Normal rate, regular rhythm and normal heart sounds.    Pulmonary/Chest: Effort normal and breath sounds normal.   Abdominal: Soft. Bowel sounds are normal.       Mental Status Exam: Patient is awake, alert and oriented to person, place and time   Normal memory, fund of knowledge, attention/concentration and language.    Cranial Nerves:   II: Visual acuity: normal    III: Pupils:  equal, round, reactive to light  III,IV,VI: Extra Ocular Movements: intact  V: Facial sensation: intact  VII: Facial strength: intact  VIII: Hearing: intact  IX: Palate: intact  XI: Shoulder shrug: intact  XII: Tongue movement: normal    Motor Exam: + Cogwheel rigidity right wrist.  + bradykinesia and mild hypomimia  right hand >left hand both resting and postural hand tremors    Sensory: Sensory examination is normal to light touch and pinprick     Coordination: Finger-to-nose  normal bilaterally without evidence of dysmetria.    Gait: normal based and fair foot stride, decrease arm swing right, no shuffling gait        ASSESSMENT/PLAN:       ICD-10-CM    1. Parkinson's disease without dyskinesia, unspecified whether manifestations fluctuate ([facility])  G20.A1             Patient is a 64-year-old female with newly diagnosed Parkinson disease  NM TIO scan obtained was positive and suspicious for parkinsonian syndrome    Continue Sinemet  mg,  currently taking 1 tab BID may increase it to TID if needed  Continue Propranolol ER 60 mg for tremor control      Counseled on nonpharmacological intervention and regular physical exercises    Follow-up in about 6 months        Esvin Jaeger MD  [facility] [place]      This note was prepared using Dragon Medical voice recognition dictation software. As a result errors may occur. When identified these errors have been corrected. While every attempt is made to correct errors during dictation discrepancies may still exist       Meds This Visit:  Requested Prescriptions      No prescriptions requested or ordered in this encounter       Imaging & Referrals:  None     ID#[id number]

## 2025-06-20 ENCOUNTER — TELEPHONE (OUTPATIENT)
Dept: INTERNAL MEDICINE CLINIC | Facility: CLINIC | Age: 65
End: 2025-06-20

## 2025-07-29 RX ORDER — DULOXETIN HYDROCHLORIDE 60 MG/1
60 CAPSULE, DELAYED RELEASE ORAL DAILY
Qty: 90 CAPSULE | Refills: 3 | Status: SHIPPED | OUTPATIENT
Start: 2025-07-29

## 2025-07-31 DIAGNOSIS — G25.0 BENIGN ESSENTIAL TREMOR: ICD-10-CM

## 2025-08-01 RX ORDER — PROPRANOLOL HYDROCHLORIDE 60 MG/1
1 CAPSULE, EXTENDED RELEASE ORAL EVERY EVENING
Qty: 90 CAPSULE | Refills: 1 | OUTPATIENT
Start: 2025-08-01

## 2025-08-14 ENCOUNTER — TELEPHONE (OUTPATIENT)
Dept: INTERNAL MEDICINE CLINIC | Facility: CLINIC | Age: 65
End: 2025-08-14

## (undated) DIAGNOSIS — Z13.29 SCREENING FOR THYROID DISORDER: ICD-10-CM

## (undated) DIAGNOSIS — Z13.220 SCREENING FOR LIPID DISORDERS: ICD-10-CM

## (undated) DIAGNOSIS — Z00.00 ROUTINE GENERAL MEDICAL EXAMINATION AT A HEALTH CARE FACILITY: Primary | ICD-10-CM

## (undated) DIAGNOSIS — Z13.228 SCREENING FOR METABOLIC DISORDER: ICD-10-CM

## (undated) DIAGNOSIS — Z13.0 SCREENING FOR DISORDER OF BLOOD AND BLOOD-FORMING ORGANS: ICD-10-CM

## (undated) NOTE — LETTER
4/2/2024    Ly Pelaez  [address] [facility] [id number]    Dear Ly,    We would like to inform you that your account has been charged $40 for not showing up to the office for your scheduled appointment on 04/02/2024.    Our no-show policy is as follows: A 24-hour notice is required, or you may be charged a $40 No Show fee.      If you are unable to keep your scheduled appointment, please notify us at least 24 hours in advance so we can accommodate our other patients. You may also reschedule your appointment at that time.    On the third no-show, within a 12-month period, it will be the physician’s discretion as to whether a discharge letter will be sent out disengaging you from the practice and giving you 30 days to enroll with a new non [place] physician.    If you need any assistance in attending your appointments, please call Patient Experience at [phone] so that we may help you identify possible solutions.    Sincerely,  [place]

## (undated) NOTE — LETTER
06/28/21        [address] [facility] [address]      Dear Gill Mcfadden,    [address] records indicate that you have outstanding lab work and or testing that was ordered for you and has not yet been completed:  Orders Placed This Encounter

## (undated) NOTE — LETTER
01/21/20        [address] [facility] [id number]      Dear Gill Mcfadden,    [facility] records indicate that you have outstanding lab work and or testing that was ordered for you and has not yet been completed:  Orders Placed This Encounter

## (undated) NOTE — ED AVS SNAPSHOT
Oanh Ohara   MRN: [id number]    Department:  [facility] Emergency Department   Date of Visit:  3/6/2019           Disclosure     Insurance plans vary and the physician(s) referred by the ER may not be covered by your plan.  Please contact tell this physician (or your personal doctor if your instructions are to return to your personal doctor) about any new or lasting problems. The primary care or specialist physician will see patients referred from the [facility] Emergency Department.  Bjorn Paul